# Patient Record
Sex: MALE | Race: WHITE | NOT HISPANIC OR LATINO
[De-identification: names, ages, dates, MRNs, and addresses within clinical notes are randomized per-mention and may not be internally consistent; named-entity substitution may affect disease eponyms.]

---

## 2019-01-17 ENCOUNTER — APPOINTMENT (OUTPATIENT)
Dept: INTERNAL MEDICINE | Facility: CLINIC | Age: 79
End: 2019-01-17
Payer: MEDICARE

## 2019-01-17 ENCOUNTER — RECORD ABSTRACTING (OUTPATIENT)
Age: 79
End: 2019-01-17

## 2019-01-17 ENCOUNTER — NON-APPOINTMENT (OUTPATIENT)
Age: 79
End: 2019-01-17

## 2019-01-17 VITALS — SYSTOLIC BLOOD PRESSURE: 118 MMHG | DIASTOLIC BLOOD PRESSURE: 64 MMHG

## 2019-01-17 VITALS — BODY MASS INDEX: 26.07 KG/M2 | HEIGHT: 69 IN | WEIGHT: 176 LBS

## 2019-01-17 DIAGNOSIS — R31.29 OTHER MICROSCOPIC HEMATURIA: ICD-10-CM

## 2019-01-17 DIAGNOSIS — Z87.09 PERSONAL HISTORY OF OTHER DISEASES OF THE RESPIRATORY SYSTEM: ICD-10-CM

## 2019-01-17 DIAGNOSIS — Z87.891 PERSONAL HISTORY OF NICOTINE DEPENDENCE: ICD-10-CM

## 2019-01-17 DIAGNOSIS — Z90.49 ACQUIRED ABSENCE OF OTHER SPECIFIED PARTS OF DIGESTIVE TRACT: ICD-10-CM

## 2019-01-17 DIAGNOSIS — Z82.49 FAMILY HISTORY OF ISCHEMIC HEART DISEASE AND OTHER DISEASES OF THE CIRCULATORY SYSTEM: ICD-10-CM

## 2019-01-17 LAB
BILIRUB UR QL STRIP: NEGATIVE
CLARITY UR: CLEAR
COLLECTION METHOD: NORMAL
GLUCOSE UR-MCNC: NEGATIVE
HCG UR QL: 0.2 EU/DL
HGB UR QL STRIP.AUTO: NEGATIVE
KETONES UR-MCNC: NEGATIVE
LEUKOCYTE ESTERASE UR QL STRIP: NEGATIVE
NITRITE UR QL STRIP: NEGATIVE
PH UR STRIP: 5
PROT UR STRIP-MCNC: NEGATIVE
SP GR UR STRIP: 1.01

## 2019-01-17 PROCEDURE — G0439: CPT

## 2019-01-17 PROCEDURE — 36415 COLL VENOUS BLD VENIPUNCTURE: CPT

## 2019-01-17 PROCEDURE — 93000 ELECTROCARDIOGRAM COMPLETE: CPT

## 2019-01-17 PROCEDURE — 81003 URINALYSIS AUTO W/O SCOPE: CPT | Mod: QW

## 2019-01-17 NOTE — PLAN
[FreeTextEntry1] : Check bloods\par Followup with urologist\par Same medications\par Echocardiogram to evaluate aortic valve

## 2019-01-17 NOTE — HISTORY OF PRESENT ILLNESS
[FreeTextEntry1] : Here for complete examination [de-identified] : The patient had cholecystectomy in 2017 with gangrenous gallbladder\par Past cataract surgery\par Status post coronary artery bypass grafting 2002\par Thallium stress test 2 years ago showed no ischemia but an ejection fraction of 44%\par Echocardiography done in January of 2018 showed a small inferobasal hypokinesis with an estimated ejection fraction in the 55% range and borderline enlargement of the proximal aortic root with moderate aortic insufficiency\par He has hyperlipidemia on both a statin and a fibroid with no muscle side effects\par He has a past history of prostate cancer and sees his urologist regularly\par active with no exertional symptoms

## 2019-01-17 NOTE — ASSESSMENT
[FreeTextEntry1] : Patient remains active and athletic with no symptoms with exercise\par Blood pressure is excellent\par Blood work drawn today are pending\par Past moderate aortic insufficiency on echocardiogram

## 2019-01-17 NOTE — REVIEW OF SYSTEMS
[Negative] : Heme/Lymph [Nocturia] : nocturia [Frequency] : frequency [Dysuria] : no dysuria [Incontinence] : no incontinence [Hesitancy] : no hesitancy [Hematuria] : no hematuria [Impotence] : no impotency [Poor Libido] : libido not poor

## 2019-01-17 NOTE — PHYSICAL EXAM
[No Acute Distress] : no acute distress [Well Nourished] : well nourished [Well Developed] : well developed [Well-Appearing] : well-appearing [Normal Sclera/Conjunctiva] : normal sclera/conjunctiva [PERRL] : pupils equal round and reactive to light [EOMI] : extraocular movements intact [Normal Outer Ear/Nose] : the outer ears and nose were normal in appearance [Normal Oropharynx] : the oropharynx was normal [No JVD] : no jugular venous distention [Supple] : supple [No Lymphadenopathy] : no lymphadenopathy [Thyroid Normal, No Nodules] : the thyroid was normal and there were no nodules present [No Respiratory Distress] : no respiratory distress  [Clear to Auscultation] : lungs were clear to auscultation bilaterally [No Accessory Muscle Use] : no accessory muscle use [Normal Rate] : normal rate  [Regular Rhythm] : with a regular rhythm [Normal S1, S2] : normal S1 and S2 [No Carotid Bruits] : no carotid bruits [No Abdominal Bruit] : a ~M bruit was not heard ~T in the abdomen [No Varicosities] : no varicosities [Pedal Pulses Present] : the pedal pulses are present [No Edema] : there was no peripheral edema [No Extremity Clubbing/Cyanosis] : no extremity clubbing/cyanosis [No Palpable Aorta] : no palpable aorta [Soft] : abdomen soft [Non Tender] : non-tender [Non-distended] : non-distended [No HSM] : no HSM [Normal Bowel Sounds] : normal bowel sounds [Normal Posterior Cervical Nodes] : no posterior cervical lymphadenopathy [Normal Anterior Cervical Nodes] : no anterior cervical lymphadenopathy [No CVA Tenderness] : no CVA  tenderness [No Spinal Tenderness] : no spinal tenderness [No Joint Swelling] : no joint swelling [Grossly Normal Strength/Tone] : grossly normal strength/tone [No Rash] : no rash [Normal Gait] : normal gait [Coordination Grossly Intact] : coordination grossly intact [No Focal Deficits] : no focal deficits [Deep Tendon Reflexes (DTR)] : deep tendon reflexes were 2+ and symmetric [Normal Affect] : the affect was normal [Normal Insight/Judgement] : insight and judgment were intact [Normal S1] : normal S1 [Normal S2] : normal S2 [No Gallop] : no gallop heard [I] : a grade 1 [No Masses] : no palpable masses [No Nipple Discharge] : no nipple discharge [No Axillary Lymphadenopathy] : no axillary lymphadenopathy [No Mass] : no mass [Testes Mass (___cm)] : there were no testicular masses [No Prostate Nodules] : no prostate nodules [Stool Occult Blood] : stool negative for occult blood [FreeTextEntry1] : decreased anal tone

## 2019-01-22 ENCOUNTER — RESULT REVIEW (OUTPATIENT)
Age: 79
End: 2019-01-22

## 2019-01-22 LAB
25(OH)D3 SERPL-MCNC: 24.8 NG/ML
ALBUMIN SERPL ELPH-MCNC: 4.6 G/DL
ALP BLD-CCNC: 29 U/L
ALT SERPL-CCNC: 14 U/L
ANION GAP SERPL CALC-SCNC: 13 MMOL/L
AST SERPL-CCNC: 17 U/L
BASOPHILS # BLD AUTO: 0.03 K/UL
BASOPHILS NFR BLD AUTO: 0.4 %
BILIRUB SERPL-MCNC: 0.4 MG/DL
BUN SERPL-MCNC: 23 MG/DL
CALCIUM SERPL-MCNC: 9.9 MG/DL
CHLORIDE SERPL-SCNC: 102 MMOL/L
CHOLEST SERPL-MCNC: 141 MG/DL
CHOLEST/HDLC SERPL: 3.6 RATIO
CK SERPL-CCNC: 37 U/L
CO2 SERPL-SCNC: 24 MMOL/L
CREAT SERPL-MCNC: 1.28 MG/DL
EOSINOPHIL # BLD AUTO: 0.43 K/UL
EOSINOPHIL NFR BLD AUTO: 6.3 %
FERRITIN SERPL-MCNC: 334 NG/ML
GLUCOSE SERPL-MCNC: 98 MG/DL
HBA1C MFR BLD HPLC: 5.4 %
HCT VFR BLD CALC: 37.1 %
HDLC SERPL-MCNC: 39 MG/DL
HGB BLD-MCNC: 12 G/DL
IMM GRANULOCYTES NFR BLD AUTO: 0.6 %
IRON SATN MFR SERPL: 32 %
IRON SERPL-MCNC: 126 UG/DL
LDLC SERPL CALC-MCNC: 61 MG/DL
LYMPHOCYTES # BLD AUTO: 1.47 K/UL
LYMPHOCYTES NFR BLD AUTO: 21.6 %
MAN DIFF?: NORMAL
MCHC RBC-ENTMCNC: 29.9 PG
MCHC RBC-ENTMCNC: 32.3 GM/DL
MCV RBC AUTO: 92.5 FL
MONOCYTES # BLD AUTO: 0.5 K/UL
MONOCYTES NFR BLD AUTO: 7.3 %
NEUTROPHILS # BLD AUTO: 4.34 K/UL
NEUTROPHILS NFR BLD AUTO: 63.8 %
PLATELET # BLD AUTO: 263 K/UL
POTASSIUM SERPL-SCNC: 4.7 MMOL/L
PROT SERPL-MCNC: 7.4 G/DL
PSA SERPL-MCNC: 0.8 NG/ML
RBC # BLD: 4.01 M/UL
RBC # FLD: 13.2 %
SODIUM SERPL-SCNC: 139 MMOL/L
T4 FREE SERPL-MCNC: 1.2 NG/DL
TIBC SERPL-MCNC: 392 UG/DL
TRIGL SERPL-MCNC: 204 MG/DL
TSH SERPL-ACNC: 1.28 UIU/ML
UIBC SERPL-MCNC: 266 UG/DL
VIT B12 SERPL-MCNC: 408 PG/ML
WBC # FLD AUTO: 6.81 K/UL

## 2019-01-25 ENCOUNTER — APPOINTMENT (OUTPATIENT)
Dept: INTERNAL MEDICINE | Facility: CLINIC | Age: 79
End: 2019-01-25
Payer: MEDICARE

## 2019-01-25 PROCEDURE — 93306 TTE W/DOPPLER COMPLETE: CPT

## 2019-01-29 ENCOUNTER — MEDICATION RENEWAL (OUTPATIENT)
Age: 79
End: 2019-01-29

## 2019-02-07 ENCOUNTER — MEDICATION RENEWAL (OUTPATIENT)
Age: 79
End: 2019-02-07

## 2019-05-06 ENCOUNTER — MEDICATION RENEWAL (OUTPATIENT)
Age: 79
End: 2019-05-06

## 2020-03-04 ENCOUNTER — APPOINTMENT (OUTPATIENT)
Dept: INTERNAL MEDICINE | Facility: CLINIC | Age: 80
End: 2020-03-04
Payer: MEDICARE

## 2020-03-04 ENCOUNTER — NON-APPOINTMENT (OUTPATIENT)
Age: 80
End: 2020-03-04

## 2020-03-04 VITALS — SYSTOLIC BLOOD PRESSURE: 128 MMHG | DIASTOLIC BLOOD PRESSURE: 62 MMHG

## 2020-03-04 VITALS — BODY MASS INDEX: 26.51 KG/M2 | HEIGHT: 69 IN | WEIGHT: 179 LBS

## 2020-03-04 LAB
BILIRUB UR QL STRIP: NEGATIVE
CLARITY UR: CLEAR
COLLECTION METHOD: NORMAL
GLUCOSE UR-MCNC: NEGATIVE
HCG UR QL: 0.2 EU/DL
HGB UR QL STRIP.AUTO: NEGATIVE
KETONES UR-MCNC: NEGATIVE
LEUKOCYTE ESTERASE UR QL STRIP: NEGATIVE
NITRITE UR QL STRIP: NEGATIVE
PH UR STRIP: 5
PROT UR STRIP-MCNC: NEGATIVE
SP GR UR STRIP: 1.02

## 2020-03-04 PROCEDURE — G0439: CPT | Mod: 25

## 2020-03-04 PROCEDURE — 36415 COLL VENOUS BLD VENIPUNCTURE: CPT

## 2020-03-04 PROCEDURE — 93000 ELECTROCARDIOGRAM COMPLETE: CPT | Mod: 59

## 2020-03-04 PROCEDURE — 81003 URINALYSIS AUTO W/O SCOPE: CPT | Mod: QW

## 2020-03-04 PROCEDURE — G0444 DEPRESSION SCREEN ANNUAL: CPT

## 2020-03-04 NOTE — HISTORY OF PRESENT ILLNESS
[FreeTextEntry1] :  for complete examination [de-identified] : s/p cholecystectomy in 2017 with gangrenous gallbladder\par s/p coronary artery bypass grafting x 5 in 2002\par Thallium stress test 1/2017 showed no significant ischemia but an ejection fraction of 44%\par echocardiography 1/2019 showed small inferobasal hypokinesis with an estimated ejection fraction in the 55% - 60 % range and moderate aortic insufficiency\par hyperlipidemia on both a statin and fibrate with no muscle side effects\par active, plays golf, doesn't use cart, sometimes walks up to 7 miles, no exertional issues\par does at least 50 pushups and sit ups daily\par past history of prostate cancer and sees his urologist regularly, recent rectal ok, declines rectal today\par active with no exertional symptoms\par on DAP therapy

## 2020-03-04 NOTE — HEALTH RISK ASSESSMENT
[0-5] : 0-5 [] : Yes [2 - 4 times a month (2 pts)] : 2-4 times a month (2 points) [Never (0 pts)] : Never (0 points) [1 or 2 (0 pts)] : 1 or 2 (0 points) [0] : 2) Feeling down, depressed, or hopeless: Not at all (0) [Patient reported colonoscopy was normal] : Patient reported colonoscopy was normal [de-identified] : occasional cigars [Audit-CScore] : 2 [YearQuit] : 1962 [[id number]] : 0 [ColonoscopyDate] : 1/2014

## 2020-03-04 NOTE — ASSESSMENT
[FreeTextEntry1] : Doing very well 20 years status post quintuple bypass\par Active and athletic in [employer]\par Mentally sharp\par Uses sunblock good skin care\par Closely followed by urologist\par Aortic insufficiency moderate on last echo\par Blood pressure well controlled

## 2020-03-04 NOTE — REVIEW OF SYSTEMS
[Nocturia] : nocturia [Frequency] : frequency [Negative] : Heme/Lymph [Hearing Loss] : hearing loss [Earache] : no earache [Nosebleeds] : no nosebleeds [Postnasal Drip] : no postnasal drip [Nasal Discharge] : no nasal discharge [Sore Throat] : no sore throat [Hoarseness] : no hoarseness [Chest Pain] : no chest pain [Palpitations] : no palpitations [Claudication] : no  leg claudication [Lower Ext Edema] : no lower extremity edema [Orthopena] : no orthopnea [Paroxysmal Nocturnal Dyspnea] : no paroxysmal nocturnal dyspnea [Incontinence] : no incontinence [Dysuria] : no dysuria [Hesitancy] : no hesitancy [Hematuria] : no hematuria [Poor Libido] : libido not poor [Impotence] : no impotency

## 2020-03-05 ENCOUNTER — APPOINTMENT (OUTPATIENT)
Dept: INTERNAL MEDICINE | Facility: CLINIC | Age: 80
End: 2020-03-05
Payer: MEDICARE

## 2020-03-05 PROCEDURE — 93306 TTE W/DOPPLER COMPLETE: CPT

## 2020-03-06 ENCOUNTER — TRANSCRIPTION ENCOUNTER (OUTPATIENT)
Age: 80
End: 2020-03-06

## 2020-03-06 LAB
25(OH)D3 SERPL-MCNC: 42.6 NG/ML
ALBUMIN MFR SERPL ELPH: 61.4 %
ALBUMIN SERPL ELPH-MCNC: 4.4 G/DL
ALBUMIN SERPL-MCNC: 4.4 G/DL
ALBUMIN/GLOB SERPL: 1.6 RATIO
ALP BLD-CCNC: 31 U/L
ALPHA1 GLOB MFR SERPL ELPH: 3.7 %
ALPHA1 GLOB SERPL ELPH-MCNC: 0.3 G/DL
ALPHA2 GLOB MFR SERPL ELPH: 7.9 %
ALPHA2 GLOB SERPL ELPH-MCNC: 0.6 G/DL
ALT SERPL-CCNC: 14 U/L
ANION GAP SERPL CALC-SCNC: 15 MMOL/L
APPEARANCE: CLEAR
AST SERPL-CCNC: 18 U/L
B-GLOBULIN MFR SERPL ELPH: 12.2 %
B-GLOBULIN SERPL ELPH-MCNC: 0.9 G/DL
BACTERIA: NEGATIVE
BASOPHILS # BLD AUTO: 0.04 K/UL
BASOPHILS NFR BLD AUTO: 0.6 %
BILIRUB SERPL-MCNC: 0.7 MG/DL
BILIRUBIN URINE: NEGATIVE
BLOOD URINE: NEGATIVE
BUN SERPL-MCNC: 21 MG/DL
CALCIUM SERPL-MCNC: 9.9 MG/DL
CHLORIDE SERPL-SCNC: 106 MMOL/L
CHOLEST SERPL-MCNC: 133 MG/DL
CHOLEST/HDLC SERPL: 3.2 RATIO
CK SERPL-CCNC: 68 U/L
CO2 SERPL-SCNC: 23 MMOL/L
COLOR: NORMAL
CREAT SERPL-MCNC: 1.21 MG/DL
DEPRECATED KAPPA LC FREE/LAMBDA SER: 2.15 RATIO
EOSINOPHIL # BLD AUTO: 0.34 K/UL
EOSINOPHIL NFR BLD AUTO: 5.3 %
ESTIMATED AVERAGE GLUCOSE: 105 MG/DL
FERRITIN SERPL-MCNC: 251 NG/ML
GAMMA GLOB FLD ELPH-MCNC: 1.1 G/DL
GAMMA GLOB MFR SERPL ELPH: 14.8 %
GLUCOSE QUALITATIVE U: NEGATIVE
GLUCOSE SERPL-MCNC: 100 MG/DL
HBA1C MFR BLD HPLC: 5.3 %
HCT VFR BLD CALC: 38.1 %
HDLC SERPL-MCNC: 42 MG/DL
HGB BLD-MCNC: 12.3 G/DL
HYALINE CASTS: 2 /LPF
IGA SER QL IEP: 193 MG/DL
IGG SER QL IEP: 966 MG/DL
IGM SER QL IEP: 35 MG/DL
IMM GRANULOCYTES NFR BLD AUTO: 1.1 %
INTERPRETATION SERPL IEP-IMP: NORMAL
IRON SATN MFR SERPL: 32 %
IRON SERPL-MCNC: 124 UG/DL
KAPPA LC CSF-MCNC: 1.39 MG/DL
KAPPA LC SERPL-MCNC: 2.99 MG/DL
KETONES URINE: NEGATIVE
LDLC SERPL CALC-MCNC: 57 MG/DL
LEUKOCYTE ESTERASE URINE: NEGATIVE
LYMPHOCYTES # BLD AUTO: 1.53 K/UL
LYMPHOCYTES NFR BLD AUTO: 23.8 %
M PROTEIN SPEC IFE-MCNC: NORMAL
MAN DIFF?: NORMAL
MCHC RBC-ENTMCNC: 30.6 PG
MCHC RBC-ENTMCNC: 32.3 GM/DL
MCV RBC AUTO: 94.8 FL
MICROSCOPIC-UA: NORMAL
MONOCYTES # BLD AUTO: 0.47 K/UL
MONOCYTES NFR BLD AUTO: 7.3 %
NEUTROPHILS # BLD AUTO: 3.97 K/UL
NEUTROPHILS NFR BLD AUTO: 61.9 %
NITRITE URINE: NEGATIVE
PH URINE: 5.5
PLATELET # BLD AUTO: 220 K/UL
POTASSIUM SERPL-SCNC: 4.8 MMOL/L
PROT SERPL-MCNC: 7.1 G/DL
PROTEIN URINE: NEGATIVE
PSA SERPL-MCNC: 0.91 NG/ML
RBC # BLD: 4.02 M/UL
RBC # FLD: 13.2 %
RED BLOOD CELLS URINE: 2 /HPF
SODIUM SERPL-SCNC: 144 MMOL/L
SPECIFIC GRAVITY URINE: 1.02
SQUAMOUS EPITHELIAL CELLS: 1 /HPF
T4 FREE SERPL-MCNC: 1.2 NG/DL
TIBC SERPL-MCNC: 383 UG/DL
TRIGL SERPL-MCNC: 171 MG/DL
TSH SERPL-ACNC: 1.34 UIU/ML
UIBC SERPL-MCNC: 259 UG/DL
UROBILINOGEN URINE: NORMAL
VIT B12 SERPL-MCNC: 548 PG/ML
WBC # FLD AUTO: 6.42 K/UL
WHITE BLOOD CELLS URINE: 1 /HPF

## 2021-02-09 ENCOUNTER — RX RENEWAL (OUTPATIENT)
Age: 81
End: 2021-02-09

## 2021-03-24 ENCOUNTER — RX RENEWAL (OUTPATIENT)
Age: 81
End: 2021-03-24

## 2021-04-18 ENCOUNTER — RX RENEWAL (OUTPATIENT)
Age: 81
End: 2021-04-18

## 2021-04-28 ENCOUNTER — APPOINTMENT (OUTPATIENT)
Dept: INTERNAL MEDICINE | Facility: CLINIC | Age: 81
End: 2021-04-28
Payer: MEDICARE

## 2021-04-28 ENCOUNTER — NON-APPOINTMENT (OUTPATIENT)
Age: 81
End: 2021-04-28

## 2021-04-28 VITALS
TEMPERATURE: 97.6 F | DIASTOLIC BLOOD PRESSURE: 85 MMHG | BODY MASS INDEX: 25.99 KG/M2 | HEART RATE: 64 BPM | SYSTOLIC BLOOD PRESSURE: 178 MMHG | WEIGHT: 176 LBS | OXYGEN SATURATION: 99 %

## 2021-04-28 VITALS — SYSTOLIC BLOOD PRESSURE: 128 MMHG | DIASTOLIC BLOOD PRESSURE: 68 MMHG

## 2021-04-28 DIAGNOSIS — Z23 ENCOUNTER FOR IMMUNIZATION: ICD-10-CM

## 2021-04-28 LAB
BILIRUB UR QL STRIP: NEGATIVE
GLUCOSE UR-MCNC: NEGATIVE
HCG UR QL: 0.2 EU/DL
HGB UR QL STRIP.AUTO: NEGATIVE
KETONES UR-MCNC: NEGATIVE
LEUKOCYTE ESTERASE UR QL STRIP: NEGATIVE
NITRITE UR QL STRIP: NEGATIVE
PH UR STRIP: 5
PROT UR STRIP-MCNC: NEGATIVE
SP GR UR STRIP: 1.02

## 2021-04-28 PROCEDURE — 99072 ADDL SUPL MATRL&STAF TM PHE: CPT

## 2021-04-28 PROCEDURE — G0439: CPT

## 2021-04-28 PROCEDURE — 82270 OCCULT BLOOD FECES: CPT

## 2021-04-28 PROCEDURE — 93000 ELECTROCARDIOGRAM COMPLETE: CPT | Mod: 59

## 2021-04-28 PROCEDURE — 81003 URINALYSIS AUTO W/O SCOPE: CPT | Mod: QW

## 2021-04-28 PROCEDURE — G0444 DEPRESSION SCREEN ANNUAL: CPT

## 2021-04-28 PROCEDURE — 36415 COLL VENOUS BLD VENIPUNCTURE: CPT

## 2021-04-28 RX ORDER — ASPIRIN ENTERIC COATED TABLETS 81 MG 81 MG/1
81 TABLET, DELAYED RELEASE ORAL DAILY
Refills: 0 | Status: ACTIVE | COMMUNITY
Start: 2021-04-28

## 2021-04-28 RX ORDER — MELATONIN 3 MG
25 MCG TABLET ORAL
Qty: 90 | Refills: 3 | Status: ACTIVE | COMMUNITY
Start: 2021-04-28 | End: 1900-01-01

## 2021-04-28 NOTE — HISTORY OF PRESENT ILLNESS
[FreeTextEntry1] :  for complete examination [de-identified] : s/p cholecystectomy in 2017 with gangrenous gallbladder\par s/p coronary artery bypass grafting x 5 in 2002\par Thallium stress test 1/2017 showed no significant ischemia but an ejection fraction of 44%\par echocardiography 1/2019 showed small inferobasal hypokinesis with an estimated ejection fraction in the 55% - 60 % range and moderate aortic insufficiency\par echo 5/2020 EF 50-55%, inferoseptal and basal inferior hypokinesis, 1 to 2 + AI, mild AS, borderline elevated RSP\par hyperlipidemia on both a statin and fibrate with no muscle side effects\par active, plays golf, doesn't use cart, cant walk several miles this way, sometimes walks up to 2 miles a day, no exertional issues\par still does at least 50 pushups and sit ups daily\par past history of prostate cancer and sees his urologist regularly, recent rectal ok, declines rectal today\par active with no exertional symptoms\par on DAP therapy\par mild anemia

## 2021-04-28 NOTE — PHYSICAL EXAM
[Well Nourished] : well nourished [Well Developed] : well developed [Well-Appearing] : well-appearing [Normal Sclera/Conjunctiva] : normal sclera/conjunctiva [PERRL] : pupils equal round and reactive to light [EOMI] : extraocular movements intact [Normal Outer Ear/Nose] : the outer ears and nose were normal in appearance [Normal Oropharynx] : the oropharynx was normal [No JVD] : no jugular venous distention [No Lymphadenopathy] : no lymphadenopathy [Supple] : supple [Thyroid Normal, No Nodules] : the thyroid was normal and there were no nodules present [No Respiratory Distress] : no respiratory distress  [No Accessory Muscle Use] : no accessory muscle use [Clear to Auscultation] : lungs were clear to auscultation bilaterally [Normal Rate] : normal rate  [Regular Rhythm] : with a regular rhythm [Normal S1, S2] : normal S1 and S2 [Distant] : the heart sounds were distant [I] : a grade 1 [No Carotid Bruits] : no carotid bruits [No Abdominal Bruit] : a ~M bruit was not heard ~T in the abdomen [No Varicosities] : no varicosities [Pedal Pulses Present] : the pedal pulses are present [No Palpable Aorta] : no palpable aorta [No Extremity Clubbing/Cyanosis] : no extremity clubbing/cyanosis [___ +] : bilateral [unfilled]U+ pretibial pitting edema [Soft] : abdomen soft [Non Tender] : non-tender [Non-distended] : non-distended [No HSM] : no HSM [Normal Bowel Sounds] : normal bowel sounds [Normal Posterior Cervical Nodes] : no posterior cervical lymphadenopathy [Normal Anterior Cervical Nodes] : no anterior cervical lymphadenopathy [No CVA Tenderness] : no CVA  tenderness [No Spinal Tenderness] : no spinal tenderness [No Joint Swelling] : no joint swelling [Grossly Normal Strength/Tone] : grossly normal strength/tone [No Rash] : no rash [Coordination Grossly Intact] : coordination grossly intact [No Focal Deficits] : no focal deficits [Normal Gait] : normal gait [Deep Tendon Reflexes (DTR)] : deep tendon reflexes were 2+ and symmetric [Normal Affect] : the affect was normal [Normal Insight/Judgement] : insight and judgment were intact [Normal Appearance] : normal in appearance [No Masses] : no palpable masses [No Mass] : no mass [Stool Occult Blood] : stool negative for occult blood [No Prostate Nodules] : no prostate nodules

## 2021-04-28 NOTE — HEALTH RISK ASSESSMENT
[] : Yes [0-5] : 0-5 [1 or 2 (0 pts)] : 1 or 2 (0 points) [Never (0 pts)] : Never (0 points) [0] : 2) Feeling down, depressed, or hopeless: Not at all (0) [Patient reported colonoscopy was normal] : Patient reported colonoscopy was normal [Monthly or less (1 pt)] : Monthly or less (1 point) [No falls in past year] : Patient reported no falls in the past year [de-identified] : occasional cigars [YearQuit] : 1962 [Audit-CScore] : 2 [[id number]] : 0 [ColonoscopyDate] : 1/2014

## 2021-04-28 NOTE — PLAN
[FreeTextEntry1] : check bloods\par heme consult if anemia worse\par echo\par derm and urology follow up\par after above consider d/c of asa or clopidogrel

## 2021-04-28 NOTE — REVIEW OF SYSTEMS
[Hearing Loss] : hearing loss [Nocturia] : nocturia [Frequency] : frequency [Negative] : Heme/Lymph [Earache] : no earache [Nosebleeds] : no nosebleeds [Postnasal Drip] : no postnasal drip [Nasal Discharge] : no nasal discharge [Sore Throat] : no sore throat [Hoarseness] : no hoarseness [Chest Pain] : no chest pain [Palpitations] : no palpitations [Claudication] : no  leg claudication [Lower Ext Edema] : no lower extremity edema [Orthopena] : no orthopnea [Paroxysmal Nocturnal Dyspnea] : no paroxysmal nocturnal dyspnea [Dysuria] : no dysuria [Incontinence] : no incontinence [Hesitancy] : no hesitancy [Hematuria] : no hematuria [Impotence] : no impotency [Poor Libido] : libido not poor [FreeTextEntry8] : stable, nocturia 1 to 2

## 2021-04-28 NOTE — ASSESSMENT
[FreeTextEntry1] : Doing well, active, 21 years status post quintuple bypass\par Mentally sharp\par Uses sunblock good skin care\par needs to see dermatologist\par Closely followed by urologist\par Aortic insufficiency moderate on last echo\par Blood pressure well controlled\par on DAP, probably not necessary, but no bleeding issues

## 2021-04-29 ENCOUNTER — NON-APPOINTMENT (OUTPATIENT)
Age: 81
End: 2021-04-29

## 2021-04-29 LAB
25(OH)D3 SERPL-MCNC: 52.9 NG/ML
ALBUMIN SERPL ELPH-MCNC: 4.8 G/DL
ALP BLD-CCNC: 39 U/L
ALT SERPL-CCNC: 19 U/L
ANION GAP SERPL CALC-SCNC: 13 MMOL/L
APPEARANCE: CLEAR
AST SERPL-CCNC: 22 U/L
BACTERIA: NEGATIVE
BASOPHILS # BLD AUTO: 0.05 K/UL
BASOPHILS NFR BLD AUTO: 0.6 %
BILIRUB SERPL-MCNC: 0.5 MG/DL
BILIRUBIN URINE: NEGATIVE
BLOOD URINE: NEGATIVE
BUN SERPL-MCNC: 25 MG/DL
CALCIUM SERPL-MCNC: 10 MG/DL
CHLORIDE SERPL-SCNC: 106 MMOL/L
CHOLEST SERPL-MCNC: 176 MG/DL
CK SERPL-CCNC: 60 U/L
CO2 SERPL-SCNC: 24 MMOL/L
COLOR: NORMAL
CREAT SERPL-MCNC: 1.38 MG/DL
EOSINOPHIL # BLD AUTO: 0.52 K/UL
EOSINOPHIL NFR BLD AUTO: 6.4 %
ESTIMATED AVERAGE GLUCOSE: 108 MG/DL
FERRITIN SERPL-MCNC: 331 NG/ML
GLUCOSE QUALITATIVE U: NEGATIVE
GLUCOSE SERPL-MCNC: 104 MG/DL
HBA1C MFR BLD HPLC: 5.4 %
HCT VFR BLD CALC: 39.3 %
HDLC SERPL-MCNC: 47 MG/DL
HGB BLD-MCNC: 12.4 G/DL
HYALINE CASTS: 1 /LPF
IMM GRANULOCYTES NFR BLD AUTO: 1.1 %
IRON SATN MFR SERPL: 32 %
IRON SERPL-MCNC: 116 UG/DL
KETONES URINE: NEGATIVE
LDLC SERPL CALC-MCNC: 93 MG/DL
LEUKOCYTE ESTERASE URINE: NEGATIVE
LYMPHOCYTES # BLD AUTO: 1.76 K/UL
LYMPHOCYTES NFR BLD AUTO: 21.6 %
MAN DIFF?: NORMAL
MCHC RBC-ENTMCNC: 29.6 PG
MCHC RBC-ENTMCNC: 31.6 GM/DL
MCV RBC AUTO: 93.8 FL
MICROSCOPIC-UA: NORMAL
MONOCYTES # BLD AUTO: 0.64 K/UL
MONOCYTES NFR BLD AUTO: 7.8 %
NEUTROPHILS # BLD AUTO: 5.1 K/UL
NEUTROPHILS NFR BLD AUTO: 62.5 %
NITRITE URINE: NEGATIVE
NONHDLC SERPL-MCNC: 130 MG/DL
PH URINE: 5
PLATELET # BLD AUTO: 271 K/UL
POTASSIUM SERPL-SCNC: 4.6 MMOL/L
PROT SERPL-MCNC: 7.4 G/DL
PROTEIN URINE: NEGATIVE
PSA SERPL-MCNC: 1.12 NG/ML
RBC # BLD: 4.19 M/UL
RBC # FLD: 13.2 %
RED BLOOD CELLS URINE: 0 /HPF
SODIUM SERPL-SCNC: 143 MMOL/L
SPECIFIC GRAVITY URINE: 1.01
SQUAMOUS EPITHELIAL CELLS: 0 /HPF
T4 FREE SERPL-MCNC: 1.2 NG/DL
TIBC SERPL-MCNC: 366 UG/DL
TRIGL SERPL-MCNC: 185 MG/DL
TSH SERPL-ACNC: 1.64 UIU/ML
UIBC SERPL-MCNC: 251 UG/DL
UROBILINOGEN URINE: NORMAL
VIT B12 SERPL-MCNC: 557 PG/ML
WBC # FLD AUTO: 8.16 K/UL
WHITE BLOOD CELLS URINE: 1 /HPF

## 2021-04-30 LAB
ALBUMIN MFR SERPL ELPH: 59.8 %
ALBUMIN SERPL-MCNC: 4.4 G/DL
ALBUMIN/GLOB SERPL: 1.5 RATIO
ALPHA1 GLOB MFR SERPL ELPH: 3.9 %
ALPHA1 GLOB SERPL ELPH-MCNC: 0.3 G/DL
ALPHA2 GLOB MFR SERPL ELPH: 8.6 %
ALPHA2 GLOB SERPL ELPH-MCNC: 0.6 G/DL
B-GLOBULIN MFR SERPL ELPH: 13.1 %
B-GLOBULIN SERPL ELPH-MCNC: 1 G/DL
DEPRECATED KAPPA LC FREE/LAMBDA SER: 1.89 RATIO
GAMMA GLOB FLD ELPH-MCNC: 1.1 G/DL
GAMMA GLOB MFR SERPL ELPH: 14.6 %
IGA SER QL IEP: 211 MG/DL
IGG SER QL IEP: 1135 MG/DL
IGM SER QL IEP: 39 MG/DL
INTERPRETATION SERPL IEP-IMP: NORMAL
KAPPA LC CSF-MCNC: 1.88 MG/DL
KAPPA LC SERPL-MCNC: 3.55 MG/DL
M PROTEIN SPEC IFE-MCNC: NORMAL
PROT SERPL-MCNC: 7.4 G/DL
PROT SERPL-MCNC: 7.4 G/DL

## 2021-05-10 LAB
ANION GAP SERPL CALC-SCNC: 14 MMOL/L
BUN SERPL-MCNC: 30 MG/DL
CALCIUM SERPL-MCNC: 9.8 MG/DL
CHLORIDE SERPL-SCNC: 107 MMOL/L
CO2 SERPL-SCNC: 23 MMOL/L
CREAT SERPL-MCNC: 1.31 MG/DL
GLUCOSE SERPL-MCNC: 137 MG/DL
POTASSIUM SERPL-SCNC: 4.8 MMOL/L
SODIUM SERPL-SCNC: 144 MMOL/L

## 2021-06-01 ENCOUNTER — APPOINTMENT (OUTPATIENT)
Dept: INTERNAL MEDICINE | Facility: CLINIC | Age: 81
End: 2021-06-01
Payer: MEDICARE

## 2021-06-01 PROCEDURE — 99072 ADDL SUPL MATRL&STAF TM PHE: CPT

## 2021-06-01 PROCEDURE — 93306 TTE W/DOPPLER COMPLETE: CPT

## 2022-04-22 ENCOUNTER — RX RENEWAL (OUTPATIENT)
Age: 82
End: 2022-04-22

## 2022-04-25 ENCOUNTER — RX RENEWAL (OUTPATIENT)
Age: 82
End: 2022-04-25

## 2022-06-24 ENCOUNTER — RX RENEWAL (OUTPATIENT)
Age: 82
End: 2022-06-24

## 2022-07-14 ENCOUNTER — NON-APPOINTMENT (OUTPATIENT)
Age: 82
End: 2022-07-14

## 2022-07-14 ENCOUNTER — APPOINTMENT (OUTPATIENT)
Dept: INTERNAL MEDICINE | Facility: CLINIC | Age: 82
End: 2022-07-14

## 2022-07-14 VITALS
DIASTOLIC BLOOD PRESSURE: 76 MMHG | OXYGEN SATURATION: 98 % | WEIGHT: 182 LBS | TEMPERATURE: 97.8 F | BODY MASS INDEX: 26.96 KG/M2 | HEART RATE: 69 BPM | HEIGHT: 69 IN | SYSTOLIC BLOOD PRESSURE: 143 MMHG

## 2022-07-14 DIAGNOSIS — Z00.00 ENCOUNTER FOR GENERAL ADULT MEDICAL EXAMINATION W/OUT ABNORMAL FINDINGS: ICD-10-CM

## 2022-07-14 LAB
BILIRUB UR QL STRIP: NORMAL
CLARITY UR: CLEAR
COLLECTION METHOD: NORMAL
GLUCOSE UR-MCNC: NORMAL
HCG UR QL: 0.2 EU/DL
HGB UR QL STRIP.AUTO: NORMAL
KETONES UR-MCNC: NORMAL
LEUKOCYTE ESTERASE UR QL STRIP: NORMAL
NITRITE UR QL STRIP: NORMAL
PH UR STRIP: 6
PROT UR STRIP-MCNC: NORMAL
SP GR UR STRIP: 1.02

## 2022-07-14 PROCEDURE — G0444 DEPRESSION SCREEN ANNUAL: CPT

## 2022-07-14 PROCEDURE — 93000 ELECTROCARDIOGRAM COMPLETE: CPT | Mod: 59

## 2022-07-14 PROCEDURE — 81003 URINALYSIS AUTO W/O SCOPE: CPT | Mod: QW

## 2022-07-14 PROCEDURE — G0439: CPT

## 2022-07-14 PROCEDURE — 36415 COLL VENOUS BLD VENIPUNCTURE: CPT

## 2022-07-14 NOTE — PHYSICAL EXAM
[Well Nourished] : well nourished [Well Developed] : well developed [Well-Appearing] : well-appearing [Normal Sclera/Conjunctiva] : normal sclera/conjunctiva [PERRL] : pupils equal round and reactive to light [EOMI] : extraocular movements intact [Normal Outer Ear/Nose] : the outer ears and nose were normal in appearance [Normal Oropharynx] : the oropharynx was normal [No JVD] : no jugular venous distention [No Lymphadenopathy] : no lymphadenopathy [Supple] : supple [Thyroid Normal, No Nodules] : the thyroid was normal and there were no nodules present [No Respiratory Distress] : no respiratory distress  [No Accessory Muscle Use] : no accessory muscle use [Clear to Auscultation] : lungs were clear to auscultation bilaterally [Normal Rate] : normal rate  [Regular Rhythm] : with a regular rhythm [Normal S1, S2] : normal S1 and S2 [Distant] : the heart sounds were distant [I] : a grade 1 [No Carotid Bruits] : no carotid bruits [No Abdominal Bruit] : a ~M bruit was not heard ~T in the abdomen [No Varicosities] : no varicosities [Pedal Pulses Present] : the pedal pulses are present [No Palpable Aorta] : no palpable aorta [No Extremity Clubbing/Cyanosis] : no extremity clubbing/cyanosis [___ +] : bilateral [unfilled]U+ pretibial pitting edema [Normal Appearance] : normal in appearance [Soft] : abdomen soft [Non Tender] : non-tender [Non-distended] : non-distended [No Masses] : no abdominal mass palpated [No HSM] : no HSM [Normal Bowel Sounds] : normal bowel sounds [No Mass] : no mass [Stool Occult Blood] : stool negative for occult blood [No Prostate Nodules] : no prostate nodules [Normal Posterior Cervical Nodes] : no posterior cervical lymphadenopathy [Normal Anterior Cervical Nodes] : no anterior cervical lymphadenopathy [No CVA Tenderness] : no CVA  tenderness [No Spinal Tenderness] : no spinal tenderness [No Joint Swelling] : no joint swelling [Grossly Normal Strength/Tone] : grossly normal strength/tone [No Rash] : no rash [Coordination Grossly Intact] : coordination grossly intact [No Focal Deficits] : no focal deficits [Normal Gait] : normal gait [Normal Affect] : the affect was normal [Normal Insight/Judgement] : insight and judgment were intact

## 2022-07-14 NOTE — ASSESSMENT
[FreeTextEntry1] : Doing well, active, 89 years status post quintuple bypass\par concerned over cognitive decline\par urged to see dermatologist\par Aortic insufficiency moderate on last echo\par Blood pressure well controlled\par on DAP, probably not necessary, but no bleeding issues,pt wishes to continue

## 2022-07-14 NOTE — HISTORY OF PRESENT ILLNESS
[FreeTextEntry1] : for complete examination [de-identified] : s/p cholecystectomy in 2017 with gangrenous gallbladder\par s/p coronary artery bypass grafting x 5 in 2002\par Thallium stress test 1/2017 showed no significant ischemia but an ejection fraction of 44%\par echocardiography 1/2019 showed small inferobasal hypokinesis with an estimated ejection fraction in the 55% - 60 % range and moderate aortic insufficiency\par echo 6/2021 EF 50-55%, inferoseptal and basal inferior hypokinesis, 2 + AI, mild AS\par hyperlipidemia on both a statin and fibrate with no muscle side effects\par active, plays golf, doesn't use cart, cant walk several miles this way, sometimes walks up to 2 miles a day, no exertional issues\par still does at least 50 pushups and sit ups daily\par past history of prostate cancer and sees his urologist regularly, recent rectal ok, declines rectal today\par active with no exertional symptoms\par on DAP therapy\par minimal elevation creatinine 1.31 last year, renal sono multiple cysts no hydro [id number]

## 2022-07-14 NOTE — REVIEW OF SYSTEMS
[Earache] : no earache [Hearing Loss] : hearing loss [Nosebleeds] : no nosebleeds [Postnasal Drip] : no postnasal drip [Nasal Discharge] : no nasal discharge [Sore Throat] : no sore throat [Hoarseness] : no hoarseness [Chest Pain] : no chest pain [Palpitations] : no palpitations [Claudication] : no  leg claudication [Lower Ext Edema] : no lower extremity edema [Orthopena] : no orthopnea [Paroxysmal Nocturnal Dyspnea] : no paroxysmal nocturnal dyspnea [Dysuria] : no dysuria [Incontinence] : no incontinence [Hesitancy] : no hesitancy [Nocturia] : nocturia [Hematuria] : no hematuria [Frequency] : frequency [Impotence] : no impotency [Poor Libido] : libido not poor [Headache] : no headache [Dizziness] : no dizziness [Fainting] : no fainting [Unsteady Walk] : no ataxia [Memory Loss] : memory loss [Negative] : Heme/Lymph [FreeTextEntry8] : stable, nocturia 1 to 2

## 2022-07-14 NOTE — HEALTH RISK ASSESSMENT
[Monthly or less (1 pt)] : Monthly or less (1 point) [1 or 2 (0 pts)] : 1 or 2 (0 points) [Never (0 pts)] : Never (0 points) [No falls in past year] : Patient reported no falls in the past year [0] : 2) Feeling down, depressed, or hopeless: Not at all (0) [Patient reported colonoscopy was normal] : Patient reported colonoscopy was normal [Former] : Former [Yes] : Yes [PHQ-2 Negative - No further assessment needed] : PHQ-2 Negative - No further assessment needed [de-identified] : occasional cigars [YearQuit] : 1962 [Audit-CScore] : 2 [[id number]] : 0 [ColonoscopyDate] : 1/2014

## 2022-07-15 ENCOUNTER — NON-APPOINTMENT (OUTPATIENT)
Age: 82
End: 2022-07-15

## 2022-07-15 LAB
25(OH)D3 SERPL-MCNC: 29.4 NG/ML
ALBUMIN SERPL ELPH-MCNC: 4.3 G/DL
ALP BLD-CCNC: 38 U/L
ALT SERPL-CCNC: 15 U/L
ANION GAP SERPL CALC-SCNC: 13 MMOL/L
APPEARANCE: CLEAR
AST SERPL-CCNC: 23 U/L
BACTERIA: NEGATIVE
BASOPHILS # BLD AUTO: 0.07 K/UL
BASOPHILS NFR BLD AUTO: 0.9 %
BILIRUB SERPL-MCNC: 0.3 MG/DL
BILIRUBIN URINE: NEGATIVE
BLOOD URINE: NEGATIVE
BUN SERPL-MCNC: 22 MG/DL
CALCIUM SERPL-MCNC: 9.1 MG/DL
CHLORIDE SERPL-SCNC: 108 MMOL/L
CHOLEST SERPL-MCNC: 132 MG/DL
CK SERPL-CCNC: 94 U/L
CO2 SERPL-SCNC: 21 MMOL/L
COLOR: NORMAL
CREAT SERPL-MCNC: 1.28 MG/DL
EGFR: 57 ML/MIN/1.73M2
EOSINOPHIL # BLD AUTO: 0.34 K/UL
EOSINOPHIL NFR BLD AUTO: 4.2 %
ESTIMATED AVERAGE GLUCOSE: 111 MG/DL
GLUCOSE QUALITATIVE U: NEGATIVE
GLUCOSE SERPL-MCNC: 92 MG/DL
HBA1C MFR BLD HPLC: 5.5 %
HCT VFR BLD CALC: 34 %
HDLC SERPL-MCNC: 38 MG/DL
HGB BLD-MCNC: 11 G/DL
HYALINE CASTS: 0 /LPF
IMM GRANULOCYTES NFR BLD AUTO: 0.7 %
KETONES URINE: NEGATIVE
LDLC SERPL CALC-MCNC: 60 MG/DL
LEUKOCYTE ESTERASE URINE: NEGATIVE
LYMPHOCYTES # BLD AUTO: 1.59 K/UL
LYMPHOCYTES NFR BLD AUTO: 19.6 %
MAN DIFF?: NORMAL
MCHC RBC-ENTMCNC: 30.4 PG
MCHC RBC-ENTMCNC: 32.4 GM/DL
MCV RBC AUTO: 93.9 FL
MICROSCOPIC-UA: NORMAL
MONOCYTES # BLD AUTO: 0.67 K/UL
MONOCYTES NFR BLD AUTO: 8.3 %
NEUTROPHILS # BLD AUTO: 5.38 K/UL
NEUTROPHILS NFR BLD AUTO: 66.3 %
NITRITE URINE: NEGATIVE
NONHDLC SERPL-MCNC: 94 MG/DL
PH URINE: 5.5
PLATELET # BLD AUTO: 235 K/UL
POTASSIUM SERPL-SCNC: 4.4 MMOL/L
PROT SERPL-MCNC: 6.7 G/DL
PROTEIN URINE: NEGATIVE
PSA SERPL-MCNC: 1.2 NG/ML
RBC # BLD: 3.62 M/UL
RBC # FLD: 13.4 %
RED BLOOD CELLS URINE: 1 /HPF
SODIUM SERPL-SCNC: 142 MMOL/L
SPECIFIC GRAVITY URINE: 1.01
SQUAMOUS EPITHELIAL CELLS: 0 /HPF
T4 FREE SERPL-MCNC: 1.2 NG/DL
TRIGL SERPL-MCNC: 171 MG/DL
TSH SERPL-ACNC: 1.25 UIU/ML
UROBILINOGEN URINE: NORMAL
VIT B12 SERPL-MCNC: 390 PG/ML
WBC # FLD AUTO: 8.11 K/UL
WHITE BLOOD CELLS URINE: 0 /HPF

## 2022-07-30 ENCOUNTER — RX RENEWAL (OUTPATIENT)
Age: 82
End: 2022-07-30

## 2022-08-04 ENCOUNTER — APPOINTMENT (OUTPATIENT)
Dept: INTERNAL MEDICINE | Facility: CLINIC | Age: 82
End: 2022-08-04

## 2022-08-04 PROCEDURE — 93306 TTE W/DOPPLER COMPLETE: CPT

## 2023-02-06 ENCOUNTER — APPOINTMENT (OUTPATIENT)
Dept: INTERNAL MEDICINE | Facility: CLINIC | Age: 83
End: 2023-02-06
Payer: MEDICARE

## 2023-02-06 VITALS
OXYGEN SATURATION: 98 % | HEIGHT: 69 IN | WEIGHT: 178 LBS | TEMPERATURE: 97.8 F | BODY MASS INDEX: 26.36 KG/M2 | HEART RATE: 63 BPM | SYSTOLIC BLOOD PRESSURE: 147 MMHG | DIASTOLIC BLOOD PRESSURE: 77 MMHG

## 2023-02-06 PROCEDURE — 99214 OFFICE O/P EST MOD 30 MIN: CPT

## 2023-02-06 NOTE — HISTORY OF PRESENT ILLNESS
[FreeTextEntry8] : Patient is 82 year male  with PMH of  CAD- s/p CABAG, Anemia, BPH, Hyperlipidemia came today with c/o right  side lower back with radiation to the right buttock hip pain for about 6 month, lately is getting worse, mostly patient feels pain when he gets up from the bed in  the morning

## 2023-03-18 ENCOUNTER — RX RENEWAL (OUTPATIENT)
Age: 83
End: 2023-03-18

## 2023-05-19 RX ORDER — FENOFIBRATE 134 MG/1
134 CAPSULE ORAL
Qty: 90 | Refills: 3 | Status: ACTIVE | COMMUNITY
Start: 2021-04-18 | End: 1900-01-01

## 2023-09-12 ENCOUNTER — APPOINTMENT (OUTPATIENT)
Dept: INTERNAL MEDICINE | Facility: CLINIC | Age: 83
End: 2023-09-12
Payer: MEDICARE

## 2023-09-12 VITALS
HEIGHT: 69 IN | SYSTOLIC BLOOD PRESSURE: 144 MMHG | OXYGEN SATURATION: 98 % | WEIGHT: 178 LBS | HEART RATE: 77 BPM | BODY MASS INDEX: 26.36 KG/M2 | DIASTOLIC BLOOD PRESSURE: 65 MMHG

## 2023-09-12 PROCEDURE — 99213 OFFICE O/P EST LOW 20 MIN: CPT

## 2023-09-18 ENCOUNTER — APPOINTMENT (OUTPATIENT)
Dept: INTERNAL MEDICINE | Facility: CLINIC | Age: 83
End: 2023-09-18
Payer: MEDICARE

## 2023-09-18 VITALS
SYSTOLIC BLOOD PRESSURE: 165 MMHG | BODY MASS INDEX: 25.76 KG/M2 | DIASTOLIC BLOOD PRESSURE: 66 MMHG | HEART RATE: 66 BPM | TEMPERATURE: 97.9 F | OXYGEN SATURATION: 97 % | HEIGHT: 68 IN | WEIGHT: 170 LBS

## 2023-09-18 LAB
BASOPHILS # BLD AUTO: 0.04 K/UL
BASOPHILS NFR BLD AUTO: 0.5 %
EOSINOPHIL # BLD AUTO: 0.37 K/UL
EOSINOPHIL NFR BLD AUTO: 4.9 %
FERRITIN SERPL-MCNC: 235 NG/ML
FOLATE SERPL-MCNC: 14 NG/ML
HCT VFR BLD CALC: 35.6 %
HGB BLD-MCNC: 11 G/DL
IMM GRANULOCYTES NFR BLD AUTO: 0.7 %
IRON SATN MFR SERPL: 25 %
IRON SERPL-MCNC: 97 UG/DL
LYMPHOCYTES # BLD AUTO: 1.69 K/UL
LYMPHOCYTES NFR BLD AUTO: 22.4 %
MAN DIFF?: NORMAL
MCHC RBC-ENTMCNC: 30 PG
MCHC RBC-ENTMCNC: 30.9 GM/DL
MCV RBC AUTO: 97 FL
MONOCYTES # BLD AUTO: 0.58 K/UL
MONOCYTES NFR BLD AUTO: 7.7 %
NEUTROPHILS # BLD AUTO: 4.81 K/UL
NEUTROPHILS NFR BLD AUTO: 63.8 %
PLATELET # BLD AUTO: 216 K/UL
RBC # BLD: 3.67 M/UL
RBC # FLD: 13.5 %
TIBC SERPL-MCNC: 382 UG/DL
UIBC SERPL-MCNC: 285 UG/DL
VIT B12 SERPL-MCNC: 293 PG/ML
WBC # FLD AUTO: 7.54 K/UL

## 2023-09-18 PROCEDURE — 99214 OFFICE O/P EST MOD 30 MIN: CPT

## 2023-09-29 ENCOUNTER — APPOINTMENT (OUTPATIENT)
Dept: MRI IMAGING | Facility: CLINIC | Age: 83
End: 2023-09-29
Payer: MEDICARE

## 2023-09-29 PROCEDURE — 72157 MRI CHEST SPINE W/O & W/DYE: CPT

## 2023-09-29 PROCEDURE — A9585: CPT

## 2023-09-29 PROCEDURE — 72158 MRI LUMBAR SPINE W/O & W/DYE: CPT

## 2023-10-07 ENCOUNTER — RX RENEWAL (OUTPATIENT)
Age: 83
End: 2023-10-07

## 2023-10-09 ENCOUNTER — RX RENEWAL (OUTPATIENT)
Age: 83
End: 2023-10-09

## 2023-10-13 ENCOUNTER — APPOINTMENT (OUTPATIENT)
Dept: ORTHOPEDIC SURGERY | Facility: CLINIC | Age: 83
End: 2023-10-13
Payer: MEDICARE

## 2023-10-13 VITALS — BODY MASS INDEX: 25.76 KG/M2 | HEIGHT: 68 IN | WEIGHT: 170 LBS

## 2023-10-13 PROCEDURE — 99205 OFFICE O/P NEW HI 60 MIN: CPT

## 2023-11-30 ENCOUNTER — APPOINTMENT (OUTPATIENT)
Dept: INTERNAL MEDICINE | Facility: CLINIC | Age: 83
End: 2023-11-30
Payer: MEDICARE

## 2023-11-30 ENCOUNTER — NON-APPOINTMENT (OUTPATIENT)
Age: 83
End: 2023-11-30

## 2023-11-30 VITALS — WEIGHT: 176 LBS | HEIGHT: 68 IN | HEART RATE: 68 BPM | OXYGEN SATURATION: 98 % | BODY MASS INDEX: 26.67 KG/M2

## 2023-11-30 PROCEDURE — 93000 ELECTROCARDIOGRAM COMPLETE: CPT

## 2023-11-30 PROCEDURE — 36415 COLL VENOUS BLD VENIPUNCTURE: CPT

## 2023-11-30 PROCEDURE — 99214 OFFICE O/P EST MOD 30 MIN: CPT | Mod: 25

## 2023-11-30 RX ORDER — CYCLOBENZAPRINE HYDROCHLORIDE 5 MG/1
5 TABLET, FILM COATED ORAL
Qty: 20 | Refills: 0 | Status: COMPLETED | COMMUNITY
Start: 2023-02-06 | End: 2023-11-30

## 2023-11-30 RX ORDER — TAMSULOSIN HYDROCHLORIDE 0.4 MG/1
0.4 CAPSULE ORAL
Qty: 90 | Refills: 3 | Status: ACTIVE | COMMUNITY
Start: 2021-04-28 | End: 1900-01-01

## 2023-11-30 RX ORDER — CLOPIDOGREL BISULFATE 75 MG/1
75 TABLET, FILM COATED ORAL
Qty: 90 | Refills: 3 | Status: COMPLETED | COMMUNITY
Start: 2019-02-07 | End: 2023-11-30

## 2023-11-30 RX ORDER — CHOLECALCIFEROL (VITAMIN D3) 25 MCG
25 MCG CAPSULE ORAL
Qty: 90 | Refills: 3 | Status: COMPLETED | COMMUNITY
Start: 2022-04-25 | End: 2023-11-30

## 2023-12-01 ENCOUNTER — APPOINTMENT (OUTPATIENT)
Dept: INTERNAL MEDICINE | Facility: CLINIC | Age: 83
End: 2023-12-01
Payer: MEDICARE

## 2023-12-01 ENCOUNTER — NON-APPOINTMENT (OUTPATIENT)
Age: 83
End: 2023-12-01

## 2023-12-01 PROCEDURE — 93306 TTE W/DOPPLER COMPLETE: CPT

## 2023-12-04 ENCOUNTER — NON-APPOINTMENT (OUTPATIENT)
Age: 83
End: 2023-12-04

## 2023-12-04 RX ORDER — SODIUM SULFATE, POTASSIUM SULFATE AND MAGNESIUM SULFATE 1.6; 3.13; 17.5 G/177ML; G/177ML; G/177ML
17.5-3.13-1.6 SOLUTION ORAL
Qty: 2 | Refills: 0 | Status: ACTIVE | COMMUNITY
Start: 2023-12-04 | End: 1900-01-01

## 2023-12-05 ENCOUNTER — APPOINTMENT (OUTPATIENT)
Dept: INTERNAL MEDICINE | Facility: CLINIC | Age: 83
End: 2023-12-05
Payer: MEDICARE

## 2023-12-05 VITALS
TEMPERATURE: 97.7 F | WEIGHT: 177 LBS | HEIGHT: 68 IN | OXYGEN SATURATION: 98 % | HEART RATE: 67 BPM | BODY MASS INDEX: 26.83 KG/M2

## 2023-12-05 VITALS — SYSTOLIC BLOOD PRESSURE: 122 MMHG | DIASTOLIC BLOOD PRESSURE: 66 MMHG

## 2023-12-05 VITALS — SYSTOLIC BLOOD PRESSURE: 120 MMHG | DIASTOLIC BLOOD PRESSURE: 62 MMHG

## 2023-12-05 DIAGNOSIS — I25.10 ATHEROSCLEROTIC HEART DISEASE OF NATIVE CORONARY ARTERY W/OUT ANGINA PECTORIS: ICD-10-CM

## 2023-12-05 LAB
ALBUMIN SERPL ELPH-MCNC: 4.3 G/DL
ALP BLD-CCNC: 40 U/L
ALT SERPL-CCNC: 17 U/L
ANION GAP SERPL CALC-SCNC: 12 MMOL/L
AST SERPL-CCNC: 18 U/L
BASOPHILS # BLD AUTO: 0.05 K/UL
BASOPHILS NFR BLD AUTO: 0.7 %
BILIRUB SERPL-MCNC: 0.4 MG/DL
BUN SERPL-MCNC: 27 MG/DL
CALCIUM SERPL-MCNC: 9.2 MG/DL
CHLORIDE SERPL-SCNC: 111 MMOL/L
CO2 SERPL-SCNC: 23 MMOL/L
CREAT SERPL-MCNC: 1.47 MG/DL
EGFR: 48 ML/MIN/1.73M2
EOSINOPHIL # BLD AUTO: 0.32 K/UL
EOSINOPHIL NFR BLD AUTO: 4.3 %
ESTIMATED AVERAGE GLUCOSE: 105 MG/DL
GLUCOSE SERPL-MCNC: 122 MG/DL
HBA1C MFR BLD HPLC: 5.3 %
HCT VFR BLD CALC: 35.6 %
HGB BLD-MCNC: 11.4 G/DL
IMM GRANULOCYTES NFR BLD AUTO: 0.7 %
LYMPHOCYTES # BLD AUTO: 1.35 K/UL
LYMPHOCYTES NFR BLD AUTO: 18.3 %
MAN DIFF?: NORMAL
MCHC RBC-ENTMCNC: 30 PG
MCHC RBC-ENTMCNC: 32 GM/DL
MCV RBC AUTO: 93.7 FL
MONOCYTES # BLD AUTO: 0.43 K/UL
MONOCYTES NFR BLD AUTO: 5.8 %
NEUTROPHILS # BLD AUTO: 5.18 K/UL
NEUTROPHILS NFR BLD AUTO: 70.2 %
PLATELET # BLD AUTO: 225 K/UL
POTASSIUM SERPL-SCNC: 4.1 MMOL/L
PROT SERPL-MCNC: 6.8 G/DL
RBC # BLD: 3.8 M/UL
RBC # FLD: 13.7 %
SODIUM SERPL-SCNC: 146 MMOL/L
WBC # FLD AUTO: 7.38 K/UL

## 2023-12-05 PROCEDURE — 99214 OFFICE O/P EST MOD 30 MIN: CPT | Mod: 25

## 2023-12-05 PROCEDURE — 36415 COLL VENOUS BLD VENIPUNCTURE: CPT

## 2023-12-05 PROCEDURE — 93000 ELECTROCARDIOGRAM COMPLETE: CPT

## 2023-12-06 DIAGNOSIS — N40.0 BENIGN PROSTATIC HYPERPLASIA WITHOUT LOWER URINARY TRACT SYMPMS: ICD-10-CM

## 2023-12-06 LAB
ALBUMIN SERPL ELPH-MCNC: 4.4 G/DL
ALP BLD-CCNC: 44 U/L
ALT SERPL-CCNC: 13 U/L
ANION GAP SERPL CALC-SCNC: 13 MMOL/L
AST SERPL-CCNC: 18 U/L
BASOPHILS # BLD AUTO: 0.04 K/UL
BASOPHILS NFR BLD AUTO: 0.5 %
BILIRUB SERPL-MCNC: 0.4 MG/DL
BUN SERPL-MCNC: 29 MG/DL
CALCIUM SERPL-MCNC: 9.3 MG/DL
CHLORIDE SERPL-SCNC: 109 MMOL/L
CO2 SERPL-SCNC: 23 MMOL/L
CREAT SERPL-MCNC: 1.45 MG/DL
EGFR: 48 ML/MIN/1.73M2
EOSINOPHIL # BLD AUTO: 0.35 K/UL
EOSINOPHIL NFR BLD AUTO: 4.4 %
GLUCOSE SERPL-MCNC: 113 MG/DL
HCT VFR BLD CALC: 37.7 %
HGB BLD-MCNC: 11.5 G/DL
IMM GRANULOCYTES NFR BLD AUTO: 0.4 %
LYMPHOCYTES # BLD AUTO: 1.71 K/UL
LYMPHOCYTES NFR BLD AUTO: 21.4 %
MAN DIFF?: NORMAL
MCHC RBC-ENTMCNC: 29.5 PG
MCHC RBC-ENTMCNC: 30.5 GM/DL
MCV RBC AUTO: 96.7 FL
MONOCYTES # BLD AUTO: 0.56 K/UL
MONOCYTES NFR BLD AUTO: 7 %
NEUTROPHILS # BLD AUTO: 5.3 K/UL
NEUTROPHILS NFR BLD AUTO: 66.3 %
NT-PROBNP SERPL-MCNC: 6081 PG/ML
PLATELET # BLD AUTO: 247 K/UL
POTASSIUM SERPL-SCNC: 5.1 MMOL/L
PROT SERPL-MCNC: 7 G/DL
RBC # BLD: 3.9 M/UL
RBC # FLD: 14.1 %
SODIUM SERPL-SCNC: 145 MMOL/L
WBC # FLD AUTO: 7.99 K/UL

## 2023-12-07 LAB
ALBUMIN MFR SERPL ELPH: 60.2 %
ALBUMIN SERPL-MCNC: 4.2 G/DL
ALBUMIN/GLOB SERPL: 1.5 RATIO
ALPHA1 GLOB MFR SERPL ELPH: 4.6 %
ALPHA1 GLOB SERPL ELPH-MCNC: 0.3 G/DL
ALPHA2 GLOB MFR SERPL ELPH: 8.1 %
ALPHA2 GLOB SERPL ELPH-MCNC: 0.6 G/DL
B-GLOBULIN MFR SERPL ELPH: 12.1 %
B-GLOBULIN SERPL ELPH-MCNC: 0.8 G/DL
DEPRECATED KAPPA LC FREE/LAMBDA SER: 2.23 RATIO
GAMMA GLOB FLD ELPH-MCNC: 1 G/DL
GAMMA GLOB MFR SERPL ELPH: 15 %
IGA SER QL IEP: 209 MG/DL
IGG SER QL IEP: 1021 MG/DL
IGM SER QL IEP: 36 MG/DL
INTERPRETATION SERPL IEP-IMP: NORMAL
KAPPA LC CSF-MCNC: 1.83 MG/DL
KAPPA LC SERPL-MCNC: 4.09 MG/DL
M PROTEIN SPEC IFE-MCNC: NORMAL
PROT SERPL-MCNC: 7 G/DL
PROT SERPL-MCNC: 7 G/DL

## 2023-12-08 ENCOUNTER — APPOINTMENT (OUTPATIENT)
Dept: ORTHOPEDIC SURGERY | Facility: CLINIC | Age: 83
End: 2023-12-08
Payer: MEDICARE

## 2023-12-08 PROCEDURE — 99214 OFFICE O/P EST MOD 30 MIN: CPT

## 2023-12-08 RX ORDER — GABAPENTIN 100 MG/1
100 CAPSULE ORAL
Qty: 60 | Refills: 2 | Status: ACTIVE | COMMUNITY
Start: 2023-10-13 | End: 1900-01-01

## 2023-12-11 ENCOUNTER — APPOINTMENT (OUTPATIENT)
Dept: PAIN MANAGEMENT | Facility: CLINIC | Age: 83
End: 2023-12-11
Payer: MEDICARE

## 2023-12-11 ENCOUNTER — APPOINTMENT (OUTPATIENT)
Dept: INTERNAL MEDICINE | Facility: AMBULATORY MEDICAL SERVICES | Age: 83
End: 2023-12-11

## 2023-12-11 VITALS — WEIGHT: 170 LBS | BODY MASS INDEX: 25.76 KG/M2 | HEIGHT: 68 IN

## 2023-12-11 PROCEDURE — 99204 OFFICE O/P NEW MOD 45 MIN: CPT

## 2023-12-13 ENCOUNTER — APPOINTMENT (OUTPATIENT)
Dept: INTERNAL MEDICINE | Facility: CLINIC | Age: 83
End: 2023-12-13
Payer: MEDICARE

## 2023-12-13 VITALS — HEART RATE: 68 BPM | OXYGEN SATURATION: 98 % | WEIGHT: 170 LBS | HEIGHT: 68 IN | BODY MASS INDEX: 25.76 KG/M2

## 2023-12-13 VITALS — SYSTOLIC BLOOD PRESSURE: 120 MMHG | DIASTOLIC BLOOD PRESSURE: 70 MMHG

## 2023-12-13 DIAGNOSIS — M54.50 LOW BACK PAIN, UNSPECIFIED: ICD-10-CM

## 2023-12-13 PROCEDURE — 99214 OFFICE O/P EST MOD 30 MIN: CPT | Mod: 25

## 2023-12-13 PROCEDURE — 36415 COLL VENOUS BLD VENIPUNCTURE: CPT

## 2023-12-13 NOTE — HISTORY OF PRESENT ILLNESS
[FreeTextEntry1] : Here to follow ongoing conditions [de-identified] : Seen today with son who is a reliable historian and we will do future contact and change of medications through his son Medication list and reasons for each medication reviewed with son in detail Significant change in echocardiogram with decreased LV function and significant increase in right-sided pressures Was found to have edema on exam last week and furosemide was begun BNP was elevated over 6000 Mild renal insufficiency Abnormal immunoelectrophoresis Very limited in quality of life and activities by lower back pain issues and is scheduled for an epidural on December 15, 2023 He is off his Plavix as of last week and held his aspirin as of December 11 Change in bowel habits reason for doing colonoscopy, Colonoscopy was postponed by me no chest pain no chest pressure no shortness of breath He is not a reliable historian due to his mild dementia plays golf 18 holes walks the course no issues, not in last few weeks due to cold weather memory issues, cognitive decline, sees neurologist Creatinine also a bit elevated on last test that has some anemia s/p cholecystectomy in 2017 with gangrenous gallbladder s/p coronary artery bypass grafting x 5 in 2002 Thallium stress test 1/2017 showed no significant ischemia but an ejection fraction of 44% echocardiography 1/2019 showed small inferobasal hypokinesis with an estimated ejection fraction in the 55% - 60 % range and moderate aortic insufficiency echo 6/2021 EF 50-55%, inferoseptal and basal inferior hypokinesis, 2 + AI, mild AS echo 8/2022 LVEF 55-60% moderate AI ? mild AS hyperlipidemia on both a statin and fibrate with no muscle side effects past history of prostate cancer and sees his urologist regularly, recent rectal ok, declines rectal today

## 2023-12-13 NOTE — PLAN
[FreeTextEntry1] : At this time he is medically and cardiologically stable and cleared for an epidural injection and he should restart his baby aspirin 24 hours after procedure I will follow him up next week and then we will proceed with considering elective coronary catheterization

## 2023-12-13 NOTE — PHYSICAL EXAM
Scheduled pt for Lt L3 L4 Tfesi for 3/26/21  Pt denies rx blood thinners  Went over pre-procedure instructions below:  Nothing to eat or drink 1 hr prior to procedure  Need to arrange transportation  Proper clothing for procedure  If ill or placed on antibiotics please call to reschedule  Covid/travel/ and vaccine instructions [No Acute Distress] : no acute distress [Well Nourished] : well nourished [Well Developed] : well developed [Well-Appearing] : well-appearing [Normal Sclera/Conjunctiva] : normal sclera/conjunctiva [PERRL] : pupils equal round and reactive to light [EOMI] : extraocular movements intact [Normal Outer Ear/Nose] : the outer ears and nose were normal in appearance [Normal Oropharynx] : the oropharynx was normal [No JVD] : no jugular venous distention [No Lymphadenopathy] : no lymphadenopathy [Supple] : supple [Thyroid Normal, No Nodules] : the thyroid was normal and there were no nodules present [No Respiratory Distress] : no respiratory distress  [No Accessory Muscle Use] : no accessory muscle use [Clear to Auscultation] : lungs were clear to auscultation bilaterally [Normal Rate] : normal rate  [Regular Rhythm] : with a regular rhythm [Normal S1, S2] : normal S1 and S2 [Distant] : the heart sounds were distant [I] : a grade 1 [No Carotid Bruits] : no carotid bruits [No Abdominal Bruit] : a ~M bruit was not heard ~T in the abdomen [No Varicosities] : no varicosities [Pedal Pulses Present] : the pedal pulses are present [No Palpable Aorta] : no palpable aorta [No Extremity Clubbing/Cyanosis] : no extremity clubbing/cyanosis [___ +] : bilateral [unfilled]U+ pretibial pitting edema [Soft] : abdomen soft [Non Tender] : non-tender [Non-distended] : non-distended [No Masses] : no abdominal mass palpated [No HSM] : no HSM [Normal Bowel Sounds] : normal bowel sounds [Normal Posterior Cervical Nodes] : no posterior cervical lymphadenopathy [Normal Anterior Cervical Nodes] : no anterior cervical lymphadenopathy [No CVA Tenderness] : no CVA  tenderness [No Spinal Tenderness] : no spinal tenderness [No Joint Swelling] : no joint swelling [Grossly Normal Strength/Tone] : grossly normal strength/tone [No Rash] : no rash [Coordination Grossly Intact] : coordination grossly intact [No Focal Deficits] : no focal deficits [Normal Affect] : the affect was normal [Normal Insight/Judgement] : insight and judgment were intact

## 2023-12-13 NOTE — ASSESSMENT
[FreeTextEntry1] : decreased LV systolic function compared to 2022 no symptoms no known events with elevated right sided pressures since 2022 Nice response with much less edema on low-dose furosemide patient also notices improvement there is a 7 pound weight loss from last week dementia sees neurologist Jose Valdes Mild elevation in creatinine Suggest best course of action for patient who is severely limited by his back pain and essentially crippled by this is to do the epidural injection on December 15 24 hours after procedure he can restart his aspirin We will follow him up next week on Monday, December 18 and at that time we will arrange electively to evaluate his bypass graft status Bloods drawn to check his renal function and potassium

## 2023-12-14 LAB
ANION GAP SERPL CALC-SCNC: 13 MMOL/L
BASOPHILS # BLD AUTO: 0.07 K/UL
BASOPHILS NFR BLD AUTO: 1 %
BUN SERPL-MCNC: 41 MG/DL
CALCIUM SERPL-MCNC: 9.7 MG/DL
CHLORIDE SERPL-SCNC: 102 MMOL/L
CO2 SERPL-SCNC: 27 MMOL/L
CREAT SERPL-MCNC: 1.41 MG/DL
EGFR: 50 ML/MIN/1.73M2
EOSINOPHIL # BLD AUTO: 0.39 K/UL
EOSINOPHIL NFR BLD AUTO: 5.6 %
GLUCOSE SERPL-MCNC: 78 MG/DL
HCT VFR BLD CALC: 41.2 %
HGB BLD-MCNC: 12.4 G/DL
IMM GRANULOCYTES NFR BLD AUTO: 0.6 %
LYMPHOCYTES # BLD AUTO: 1.62 K/UL
LYMPHOCYTES NFR BLD AUTO: 23.4 %
MAN DIFF?: NORMAL
MCHC RBC-ENTMCNC: 28.6 PG
MCHC RBC-ENTMCNC: 30.1 GM/DL
MCV RBC AUTO: 94.9 FL
MONOCYTES # BLD AUTO: 0.63 K/UL
MONOCYTES NFR BLD AUTO: 9.1 %
NEUTROPHILS # BLD AUTO: 4.16 K/UL
NEUTROPHILS NFR BLD AUTO: 60.3 %
NT-PROBNP SERPL-MCNC: 1968 PG/ML
PLATELET # BLD AUTO: 254 K/UL
POTASSIUM SERPL-SCNC: 5 MMOL/L
RBC # BLD: 4.34 M/UL
RBC # FLD: 14 %
SODIUM SERPL-SCNC: 142 MMOL/L
VIT B12 SERPL-MCNC: 821 PG/ML
WBC # FLD AUTO: 6.91 K/UL

## 2023-12-15 ENCOUNTER — APPOINTMENT (OUTPATIENT)
Dept: PAIN MANAGEMENT | Facility: CLINIC | Age: 83
End: 2023-12-15
Payer: MEDICARE

## 2023-12-15 PROCEDURE — 64483 NJX AA&/STRD TFRM EPI L/S 1: CPT

## 2023-12-15 PROCEDURE — 64484 NJX AA&/STRD TFRM EPI L/S EA: CPT

## 2023-12-15 NOTE — PROCEDURE
[FreeTextEntry1] : RIGHT L4-L5, L5-S1 transforaminal epidural steroid injection [FreeTextEntry2] : right lumbar radiculopathy [FreeTextEntry3] : Procedure Date: 12/15/2023   Preoperative Diagnosis: chronic low back pain with RIGHT sided sciatica   Procedure: RIGHT L4-L5, L5-S1 transforaminal epidural steroid injection under fluoroscopic guidance   Anesthesia: local   Complications: none   EBL: none   Procedure in detail: Patient was seen and examined. Risks, benefits, and alternatives for the procedure were discussed with the patient in detail. The patient expressed understanding, gave written and verbal consent, and placed themselves in a prone position on the procedure table. Skin overlying the lumbosacral spine was prepped with chloraprep and draped in the usual sterile fashion. Fluoroscopic images were obtained to identify the L4 and L5 vertebral body. Target was the 6 o'clock position under the RIGHT pedicle at both the L4 and L5 vertebral bodies. Skin overlying the targets was marked and infiltrated with 1% lidocaine. Using two 25 gauge, 5 inch spinal needles, these were inserted and advanced under intermittent fluoroscopic guidance. When felt to be engaged in the epidural space, lateral view was used to confirm depth. After negative aspiration for heme/csf, contrast was injected under live fluoroscopy through each needle, both of which showed contrast spread consistent with epidural flow. No evidence of intravascular or intrathecal uptake. At this point, a total of 2ml of injectate, which consisted of 1ml of 10mg/ml dexamethasone and 1ml of normal saline was injected through each needle. The needles were restyletted and withdrawn, a band aid was placed over the injection site. Patient tolerated the procedure well. The patient recovered uneventfully and was discharged home in stable condition.

## 2023-12-18 ENCOUNTER — APPOINTMENT (OUTPATIENT)
Dept: INTERNAL MEDICINE | Facility: CLINIC | Age: 83
End: 2023-12-18
Payer: MEDICARE

## 2023-12-18 VITALS — SYSTOLIC BLOOD PRESSURE: 118 MMHG | DIASTOLIC BLOOD PRESSURE: 70 MMHG

## 2023-12-18 VITALS — WEIGHT: 168 LBS | OXYGEN SATURATION: 98 % | HEART RATE: 71 BPM | HEIGHT: 68 IN | BODY MASS INDEX: 25.46 KG/M2

## 2023-12-18 PROCEDURE — 99214 OFFICE O/P EST MOD 30 MIN: CPT | Mod: 25

## 2023-12-18 PROCEDURE — 36415 COLL VENOUS BLD VENIPUNCTURE: CPT

## 2023-12-18 NOTE — HISTORY OF PRESENT ILLNESS
[FreeTextEntry1] : Here to follow ongoing conditions [de-identified] : Seen 12/13/23 with son who is a reliable historian and we will do future contact and change of medications through his son Medication list and reasons for each medication reviewed with son in detail Significant change in echocardiogram with decreased LV function and significant increase in right-sided pressures Was found to have edema on exam and furosemide was begun BNP was elevated over 6000 Mild renal insufficiency Abnormal immunoelectrophoresis Very limited in quality of life and activities by lower back pain issues  epidural on December 15, 2023 He is off his Plavix as of last week and held his aspirin as of December 11 has epidural 12/15/23 no issues, maybe a little better  BNP much better creatinine a bit up to recheck today  Change in bowel habits reason for doing colonoscopy, Colonoscopy was postponed by me no chest pain no chest pressure no shortness of breath He is not a reliable historian due to his mild dementia plays golf 18 holes walks the course no issues, not in last few weeks due to cold weather memory issues, cognitive decline, sees neurologist Creatinine also a bit elevated on last test that has some anemia s/p cholecystectomy in 2017 with gangrenous gallbladder s/p coronary artery bypass grafting x 5 in 2002 Thallium stress test 1/2017 showed no significant ischemia but an ejection fraction of 44% echocardiography 1/2019 showed small inferobasal hypokinesis with an estimated ejection fraction in the 55% - 60 % range and moderate aortic insufficiency echo 6/2021 EF 50-55%, inferoseptal and basal inferior hypokinesis, 2 + AI, mild AS echo 8/2022 LVEF 55-60% moderate AI ? mild AS hyperlipidemia on both a statin and fibrate with no muscle side effects past history of prostate cancer and sees his urologist regularly, recent rectal ok, declines rectal today

## 2023-12-18 NOTE — PHYSICAL EXAM
[No Acute Distress] : no acute distress [Well Nourished] : well nourished [Well Developed] : well developed [Well-Appearing] : well-appearing [Normal Sclera/Conjunctiva] : normal sclera/conjunctiva [PERRL] : pupils equal round and reactive to light [EOMI] : extraocular movements intact [Normal Outer Ear/Nose] : the outer ears and nose were normal in appearance [Normal Oropharynx] : the oropharynx was normal [No JVD] : no jugular venous distention [No Lymphadenopathy] : no lymphadenopathy [Supple] : supple [Thyroid Normal, No Nodules] : the thyroid was normal and there were no nodules present [No Respiratory Distress] : no respiratory distress  [No Accessory Muscle Use] : no accessory muscle use [Clear to Auscultation] : lungs were clear to auscultation bilaterally [Normal Rate] : normal rate  [Regular Rhythm] : with a regular rhythm [Normal S1, S2] : normal S1 and S2 [Distant] : the heart sounds were distant [I] : a grade 1 [No Carotid Bruits] : no carotid bruits [No Abdominal Bruit] : a ~M bruit was not heard ~T in the abdomen [No Varicosities] : no varicosities [Pedal Pulses Present] : the pedal pulses are present [No Edema] : there was no peripheral edema [No Palpable Aorta] : no palpable aorta [No Extremity Clubbing/Cyanosis] : no extremity clubbing/cyanosis [Soft] : abdomen soft [Non Tender] : non-tender [Non-distended] : non-distended [No Masses] : no abdominal mass palpated [No HSM] : no HSM [Normal Bowel Sounds] : normal bowel sounds [Normal Posterior Cervical Nodes] : no posterior cervical lymphadenopathy [Normal Anterior Cervical Nodes] : no anterior cervical lymphadenopathy [No CVA Tenderness] : no CVA  tenderness [No Spinal Tenderness] : no spinal tenderness [No Joint Swelling] : no joint swelling [Grossly Normal Strength/Tone] : grossly normal strength/tone [No Rash] : no rash [Coordination Grossly Intact] : coordination grossly intact [No Focal Deficits] : no focal deficits [Normal Affect] : the affect was normal [Normal Insight/Judgement] : insight and judgment were intact

## 2023-12-18 NOTE — ASSESSMENT
[FreeTextEntry1] : decreased LV systolic function compared to 2022 no symptoms no known events with elevated right sided pressures since 2022 Nice response with much less edema on low-dose furosemide patient also notices improvement there is a 7 pound weight loss from last week dementia sees neurologist Jose Valdes Mild elevation in creatinine suggest cath to electively to evaluate his bypass graft status

## 2023-12-19 LAB
ALBUMIN SERPL ELPH-MCNC: 4.6 G/DL
ALP BLD-CCNC: 42 U/L
ALT SERPL-CCNC: 16 U/L
ANION GAP SERPL CALC-SCNC: 10 MMOL/L
AST SERPL-CCNC: 22 U/L
BASOPHILS # BLD AUTO: 0.08 K/UL
BASOPHILS NFR BLD AUTO: 1 %
BILIRUB SERPL-MCNC: 0.6 MG/DL
BUN SERPL-MCNC: 41 MG/DL
CALCIUM SERPL-MCNC: 9.8 MG/DL
CHLORIDE SERPL-SCNC: 104 MMOL/L
CO2 SERPL-SCNC: 26 MMOL/L
CREAT SERPL-MCNC: 1.42 MG/DL
EGFR: 50 ML/MIN/1.73M2
EOSINOPHIL # BLD AUTO: 0.5 K/UL
EOSINOPHIL NFR BLD AUTO: 6 %
GLUCOSE SERPL-MCNC: 89 MG/DL
HCT VFR BLD CALC: 43.1 %
HGB BLD-MCNC: 13 G/DL
IMM GRANULOCYTES NFR BLD AUTO: 0.7 %
LYMPHOCYTES # BLD AUTO: 1.77 K/UL
LYMPHOCYTES NFR BLD AUTO: 21.3 %
MAN DIFF?: NORMAL
MCHC RBC-ENTMCNC: 28.8 PG
MCHC RBC-ENTMCNC: 30.2 GM/DL
MCV RBC AUTO: 95.4 FL
MONOCYTES # BLD AUTO: 0.65 K/UL
MONOCYTES NFR BLD AUTO: 7.8 %
NEUTROPHILS # BLD AUTO: 5.26 K/UL
NEUTROPHILS NFR BLD AUTO: 63.2 %
NT-PROBNP SERPL-MCNC: 1456 PG/ML
PLATELET # BLD AUTO: 282 K/UL
POTASSIUM SERPL-SCNC: 4.8 MMOL/L
PROT SERPL-MCNC: 7.4 G/DL
RBC # BLD: 4.52 M/UL
RBC # FLD: 13.9 %
SODIUM SERPL-SCNC: 141 MMOL/L
WBC # FLD AUTO: 8.32 K/UL

## 2023-12-29 ENCOUNTER — APPOINTMENT (OUTPATIENT)
Dept: PAIN MANAGEMENT | Facility: CLINIC | Age: 83
End: 2023-12-29
Payer: MEDICARE

## 2023-12-29 VITALS — HEIGHT: 68 IN | WEIGHT: 168 LBS | BODY MASS INDEX: 25.46 KG/M2

## 2023-12-29 PROCEDURE — 99213 OFFICE O/P EST LOW 20 MIN: CPT

## 2023-12-29 NOTE — DATA REVIEWED
[MRI] : MRI [Lumbar Spine] : lumbar spine [Report was reviewed and noted in the chart] : The report was reviewed and noted in the chart [I independently reviewed and interpreted images and report] : I independently reviewed and interpreted images and report [FreeTextEntry1] : Mild thoracic spondylosis without spinal canal or foraminal stenosis. Lumbar spondylosis, similar to L4/L5, resulting in right lateral recess narrowing and severe left foraminal narrowing. Degenerative endplate edema at L2/L3 and L4/L5. L1/L2: No spinal canal foraminal narrowing. L2/L3: Disc bulge resulting mild spinal canal narrowing and mild foraminal narrowing. L3/L4: Small disc bulge resulting in indentation of the thecal sac, mild narrowing of the left lateral recess and mild bilateral foraminal narrowing. L4/L5: Disc bulge with superimposed right lateral recess/proximal foraminal extrusion. Bilateral facet arthrosis, right greater than left with thickening of the ligamentum flavum on the right. Findings result in narrowing of the right lateral recess and mild left and severe right foraminal narrowing. L5/S1: No spinal canal foraminal narrowing. Bilateral facet arthrosis, left greater than right. Tiny ligamentum flavum cysts on the right without spinal canal or foraminal narrowing.  Ind. review- NF stenosis most notable L2/3 and severe on R L4/5 with extruded HNP

## 2023-12-29 NOTE — PHYSICAL EXAM
[de-identified] : Gen: NAD Head: NC/AT Eyes: no glasses, no scleral icterus ENT: mucous membranes moist CV: RRR, S1 S2, no mrg Lungs: CTAB, nonlabored breathing Abd: soft, NT/ND Ext: full ROM in all extremities, no peripheral edema Back: limited extension 2/2 pain, +SLR on the right Neuro: CN intact LEs +5 L +5 R hip flexion +5 L +5 R leg extension +5 L +5 R leg flexion +5 L +5 R foot dorsiflexion +5 L +5 R foot plantarflexion +5 L +5 R EHL extension Psych: normal affect Skin: no visible lesions

## 2023-12-29 NOTE — DISCUSSION/SUMMARY
[de-identified] : AIYANA VARELA is a 83 year-old man presenting for a RPV for a history of chronic low back pain with RLE radicular features.   Prior treatment: 12/15/2023: RIGHT L4-L5, L5-S1 TFESI w/ 50% relief of pain and improvement in function Gabapentin OTC NSAIDs  Plan: 1) MRI lumbar spine images reviewed with the patient. 2) May consider repeat RIGHT L4-L5, L5-S1 TFESI in the future if needed 3) Discussed treatment options, patient has not been engaged in a regular stretching regimen. Provide PT script.   4) Continue gabapentin 100mg/200mg daily; denies AEs at this time, including sedation/gait unsteadiness 5) Continue home exercise regimen 6) Continue OTC NSAIDs 7) RTC 2 months

## 2023-12-29 NOTE — HISTORY OF PRESENT ILLNESS
[Lower back] : lower back [Right Leg] : right leg [10] : 10 [Dull/Aching] : dull/aching [Radiating] : radiating [Shooting] : shooting [Stabbing] : stabbing [Throbbing] : throbbing [Intermittent] : intermittent [Household chores] : household chores [Leisure] : leisure [Social interactions] : social interactions [Meds] : meds [Sitting] : sitting [Standing] : standing [Walking] : walking [Lying in bed] : lying in bed [7] : 7 [FreeTextEntry1] : 12/29/2023  AIYANA VARELA is a 83 year-old man presenting for a RPV for a history of chronic low back pain with RLE radicular features. Patient underwent a RIGHT L4-5 L5-S1 TFESI on 12/15/2023. The patient notes having 50% relief of his pain since the procedure and notes improvement of function--particularly with standing and walking. He notes significant improvement of radicular pain in the RLE. He gets occasional tingling and numbness in the right posterior thigh and leg. No focal weakness in the lower extremities. No bowel or bladder incontinence or saddle anesthesia.  The patient states that average pain over the past week was 5/10 in severity. Mood: No anxiety or depression.  Sleep: No difficulty with sleep 2/2 pain.  12/11/2023: AIYANA VARELA is a 83 year-old man presenting for a NPV for a history of chronic low back pain with RLE radicular features. The patient notes having pain over the past year which has gotten significantly worse over the past 2-3 months. No history of trauma or focal injury. At this point, the patient endorses having an aching pain in the right low lumbosacral region with radiation to the right posterolateral thigh and lateral leg to the dorsal foot. No focal numbness or weakness in the lower extremities. No bowel or bladder incontinence or saddle anesthesia. Pain is worse with standing and walking.  The patient states that average pain over the past week was 10/10 in severity. Sleep: No difficulty with sleep initiation or maintenance at this time.  [] : no [de-identified] : MRI

## 2024-03-01 ENCOUNTER — APPOINTMENT (OUTPATIENT)
Dept: PAIN MANAGEMENT | Facility: CLINIC | Age: 84
End: 2024-03-01
Payer: MEDICARE

## 2024-03-01 VITALS — BODY MASS INDEX: 25.91 KG/M2 | HEIGHT: 68 IN | WEIGHT: 171 LBS

## 2024-03-01 PROCEDURE — 99213 OFFICE O/P EST LOW 20 MIN: CPT

## 2024-03-01 NOTE — DISCUSSION/SUMMARY
[de-identified] : AIYANA VARELA is a 83 year-old man presenting for a RPV for a history of chronic low back pain with RLE radicular features.   Prior treatment: 12/15/2023: RIGHT L4-L5, L5-S1 TFESI w/ 50% relief of pain and improvement in function Gabapentin OTC NSAIDs  Plan: 1) MRI lumbar spine images reviewed with the patient. 2) May consider repeat RIGHT L4-L5, L5-S1 TFESI in the future if needed 3) Discussed treatment options, patient has not been engaged in a regular stretching regimen. Had an extensive discussion with the patient regarding the importance of establishing a daily stretching routine. 4) Continue ibuprofen OTC and 800mg prn; patient denies AEs 5) Continue exercise routine at home 6) RTC 2 months

## 2024-03-01 NOTE — PHYSICAL EXAM
[de-identified] : Gen: NAD Head: NC/AT Eyes: no glasses, no scleral icterus ENT: mucous membranes moist CV: RRR, S1 S2, no mrg Lungs: CTAB, nonlabored breathing Abd: soft, NT/ND Ext: full ROM in all extremities, no peripheral edema Back: limited extension 2/2 pain, +SLR on the right Neuro: CN intact LEs +5 L +5 R hip flexion +5 L +5 R leg extension +5 L +5 R leg flexion +5 L +5 R foot dorsiflexion +5 L +5 R foot plantarflexion +5 L +5 R EHL extension Psych: normal affect Skin: no visible lesions

## 2024-03-01 NOTE — HISTORY OF PRESENT ILLNESS
[Lower back] : lower back [Right Leg] : right leg [7] : 7 [Dull/Aching] : dull/aching [Radiating] : radiating [Shooting] : shooting [Stabbing] : stabbing [Throbbing] : throbbing [Intermittent] : intermittent [Household chores] : household chores [Leisure] : leisure [Social interactions] : social interactions [Meds] : meds [Sitting] : sitting [Standing] : standing [Walking] : walking [Lying in bed] : lying in bed [Sleep] : sleep [FreeTextEntry1] : 3/1/2024: AIYANA VARELA is a 83 year-old man presenting for a RPV for a history of chronic low back pain with RLE radicular features. Overall, the patient feels that his pain has been stable and manageable since his RIGHT L4-L5, L5-S1 TFESI on 12/15/2023. The patient notes ongoing intermittent pain in the right low back with radiation to the right gluteal region, right posterolateral leg and dorsal foot. He notes intermittent tingling and numbness. No focal weakness. No bowel or bladder incontinence or saddle anesthesia. The patient notes that his pain is worst first thing in the morning and gradually improves throughout the day.  The patient states that average pain over the past week was 5/10 in severity. Mood: Patient denies anxiety or depression.  Sleep: Patient denies having difficulty with sleep initiation or maintenance 2/2 pain.   12/29/2023  AIYANA VARELA is a 83 year-old man presenting for a RPV for a history of chronic low back pain with RLE radicular features. Patient underwent a RIGHT L4-5 L5-S1 TFESI on 12/15/2023. The patient notes having 50% relief of his pain since the procedure and notes improvement of function--particularly with standing and walking. He notes significant improvement of radicular pain in the RLE. He gets occasional tingling and numbness in the right posterior thigh and leg. No focal weakness in the lower extremities. No bowel or bladder incontinence or saddle anesthesia.  The patient states that average pain over the past week was 5/10 in severity. Mood: No anxiety or depression.  Sleep: No difficulty with sleep 2/2 pain.  12/11/2023: AIYANA VARELA is a 83 year-old man presenting for a NPV for a history of chronic low back pain with RLE radicular features. The patient notes having pain over the past year which has gotten significantly worse over the past 2-3 months. No history of trauma or focal injury. At this point, the patient endorses having an aching pain in the right low lumbosacral region with radiation to the right posterolateral thigh and lateral leg to the dorsal foot. No focal numbness or weakness in the lower extremities. No bowel or bladder incontinence or saddle anesthesia. Pain is worse with standing and walking.  The patient states that average pain over the past week was 10/10 in severity. Sleep: No difficulty with sleep initiation or maintenance at this time.  [0] : 0 [9] : 9 [] : no [de-identified] : waking up in the morning [FreeTextEntry7] : b/l legs  [de-identified] : MRI

## 2024-03-14 ENCOUNTER — RX RENEWAL (OUTPATIENT)
Age: 84
End: 2024-03-14

## 2024-03-14 RX ORDER — FUROSEMIDE 20 MG/1
20 TABLET ORAL
Qty: 90 | Refills: 3 | Status: ACTIVE | COMMUNITY
Start: 2023-12-05 | End: 1900-01-01

## 2024-03-27 ENCOUNTER — APPOINTMENT (OUTPATIENT)
Dept: INTERNAL MEDICINE | Facility: CLINIC | Age: 84
End: 2024-03-27
Payer: MEDICARE

## 2024-03-27 VITALS
OXYGEN SATURATION: 97 % | TEMPERATURE: 97.6 F | BODY MASS INDEX: 25.01 KG/M2 | WEIGHT: 165 LBS | HEIGHT: 68 IN | SYSTOLIC BLOOD PRESSURE: 129 MMHG | HEART RATE: 81 BPM | DIASTOLIC BLOOD PRESSURE: 72 MMHG

## 2024-03-27 DIAGNOSIS — Z12.11 ENCOUNTER FOR SCREENING FOR MALIGNANT NEOPLASM OF COLON: ICD-10-CM

## 2024-03-27 DIAGNOSIS — I35.1 NONRHEUMATIC AORTIC (VALVE) INSUFFICIENCY: ICD-10-CM

## 2024-03-27 PROCEDURE — 99213 OFFICE O/P EST LOW 20 MIN: CPT

## 2024-03-27 RX ORDER — CHOLESTYRAMINE 4 G/9G
4 POWDER, FOR SUSPENSION ORAL TWICE DAILY
Qty: 60 | Refills: 3 | Status: ACTIVE | COMMUNITY
Start: 2024-03-27 | End: 1900-01-01

## 2024-03-27 NOTE — ASSESSMENT
[FreeTextEntry1] : avoid fatty oily foods. Check anemia w/u   May need a repeat colonoscopy  Try treating for bile malabsorption

## 2024-03-27 NOTE — HISTORY OF PRESENT ILLNESS
[FreeTextEntry1] : Few months ago after taking a walk- he has incontinence of stool. happened again a few times over past few months. In between no problems. He normally has 1-2 BM's a day.  he never came in for his colonoscopy- Now diarrhea has worsened  H/o CABG x5 in 2002  , high cholesterol,   H/o cholecystectomy years ago 2017. gangrenous  his bloods show mild anemia  but most recent CBC was normal  Last colonoscopy 1/2014.

## 2024-03-27 NOTE — PHYSICAL EXAM

## 2024-04-01 ENCOUNTER — NON-APPOINTMENT (OUTPATIENT)
Age: 84
End: 2024-04-01

## 2024-04-01 ENCOUNTER — APPOINTMENT (OUTPATIENT)
Dept: INTERNAL MEDICINE | Facility: CLINIC | Age: 84
End: 2024-04-01
Payer: MEDICARE

## 2024-04-01 VITALS
WEIGHT: 165 LBS | OXYGEN SATURATION: 98 % | BODY MASS INDEX: 25.01 KG/M2 | DIASTOLIC BLOOD PRESSURE: 60 MMHG | SYSTOLIC BLOOD PRESSURE: 160 MMHG | HEART RATE: 76 BPM | HEIGHT: 68 IN

## 2024-04-01 VITALS — SYSTOLIC BLOOD PRESSURE: 134 MMHG | DIASTOLIC BLOOD PRESSURE: 70 MMHG

## 2024-04-01 PROCEDURE — 99214 OFFICE O/P EST MOD 30 MIN: CPT

## 2024-04-01 PROCEDURE — G2211 COMPLEX E/M VISIT ADD ON: CPT

## 2024-04-01 PROCEDURE — 36415 COLL VENOUS BLD VENIPUNCTURE: CPT

## 2024-04-01 PROCEDURE — 93242 EXT ECG>48HR<7D RECORDING: CPT

## 2024-04-01 PROCEDURE — 93000 ELECTROCARDIOGRAM COMPLETE: CPT | Mod: 59

## 2024-04-01 RX ORDER — ATORVASTATIN CALCIUM 20 MG/1
20 TABLET, FILM COATED ORAL
Qty: 90 | Refills: 3 | Status: ACTIVE | COMMUNITY
Start: 2019-02-07 | End: 1900-01-01

## 2024-04-01 NOTE — HISTORY OF PRESENT ILLNESS
[FreeTextEntry1] : Here to follow ongoing conditions and to evaluate if patient can be cardiologically cleared for colonoscopy or endoscopy [de-identified] : no symptoms, stable above average functional status now since on cholestyramine he is constipated ecg here shows atrial fibrillation good ventricular rate, no symptoms Last seen December 18 with nice response to diuretic with improvement of edema Did not have any further cardiology evaluation and is here for follow-up Seen 12/13/23 with son who is a reliable historian and we will do future contact and change of medications through his son Medication list and reasons for each medication reviewed with son in detail Significant change in echocardiogram with decreased LV function and significant increase in right-sided pressures Was found to have edema on exam and furosemide was begun BNP was elevated over 6000 Mild renal insufficiency Abnormal immunoelectrophoresis Very limited in quality of life and activities by lower back pain issues  epidural on December 15, 2023 He is off his Plavix as of last week and held his aspirin as of December 11 has epidural 12/15/23 no issues, maybe a little better  BNP much better creatinine a bit up to recheck today  Change in bowel habits reason for doing colonoscopy, Colonoscopy was postponed by me no chest pain no chest pressure no shortness of breath He is not a reliable historian due to his mild dementia plays golf 18 holes walks the course no issues, not in last few weeks due to cold weather memory issues, cognitive decline, sees neurologist Creatinine also a bit elevated on last test that has some anemia s/p cholecystectomy in 2017 with gangrenous gallbladder s/p coronary artery bypass grafting x 5 in 2002 Thallium stress test 1/2017 showed no significant ischemia but an ejection fraction of 44% echocardiography 1/2019 showed small inferobasal hypokinesis with an estimated ejection fraction in the 55% - 60 % range and moderate aortic insufficiency echo 6/2021 EF 50-55%, inferoseptal and basal inferior hypokinesis, 2 + AI, mild AS echo 8/2022 LVEF 55-60% moderate AI ? mild AS hyperlipidemia on both a statin and fibrate with no muscle side effects past history of prostate cancer and sees his urologist regularly, recent rectal ok, declines rectal today

## 2024-04-01 NOTE — ASSESSMENT
[FreeTextEntry1] : new onset atrial fibrillation no symptoms since last visit 12/2023 echocardiogram decreased LV systolic function compared to 2022 no symptoms no known events with elevated right sided pressures since 2022 Nice response with much less edema on low-dose furosemide patient also noticed improvement dementia sees neurologist Jose Valdes Mild elevation in creatinine Loose stools and periodic incontinence and diarrhea has resolved completely on cholestyramine and now is constipated

## 2024-04-01 NOTE — PHYSICAL EXAM
[No Acute Distress] : no acute distress [Well Nourished] : well nourished [Well Developed] : well developed [Normal Sclera/Conjunctiva] : normal sclera/conjunctiva [Normal Outer Ear/Nose] : the outer ears and nose were normal in appearance [Normal Oropharynx] : the oropharynx was normal [No JVD] : no jugular venous distention [No Lymphadenopathy] : no lymphadenopathy [Supple] : supple [Thyroid Normal, No Nodules] : the thyroid was normal and there were no nodules present [No Respiratory Distress] : no respiratory distress  [No Accessory Muscle Use] : no accessory muscle use [Clear to Auscultation] : lungs were clear to auscultation bilaterally [Normal Rate] : normal rate  [Normal S1, S2] : normal S1 and S2 [Irregularly Irregular] : irregularly irregular [Distant] : the heart sounds were distant [I] : a grade 1 [No Carotid Bruits] : no carotid bruits [No Abdominal Bruit] : a ~M bruit was not heard ~T in the abdomen [No Varicosities] : no varicosities [Pedal Pulses Present] : the pedal pulses are present [No Palpable Aorta] : no palpable aorta [No Extremity Clubbing/Cyanosis] : no extremity clubbing/cyanosis [Soft] : abdomen soft [Non Tender] : non-tender [Non-distended] : non-distended [No Masses] : no abdominal mass palpated [No HSM] : no HSM [Normal Bowel Sounds] : normal bowel sounds [Normal Posterior Cervical Nodes] : no posterior cervical lymphadenopathy [Normal Anterior Cervical Nodes] : no anterior cervical lymphadenopathy [No CVA Tenderness] : no CVA  tenderness [No Spinal Tenderness] : no spinal tenderness [No Joint Swelling] : no joint swelling [Grossly Normal Strength/Tone] : grossly normal strength/tone [No Rash] : no rash [Coordination Grossly Intact] : coordination grossly intact [No Focal Deficits] : no focal deficits [Normal Affect] : the affect was normal [Normal Insight/Judgement] : insight and judgment were intact [de-identified] : mild edema

## 2024-04-01 NOTE — PLAN
[FreeTextEntry1] : suspect benefits anticoagulation outweight risks is on ASA not reliable with meds witout supervision wife not home today extended monitor switch aspirin to Xarelto or Eliquis EPS follow up after bloods reduce cholestyramine to 1/2 packet twice a day and see if this find him a happy medium Message left with son to review and make sure if we do start a blood thinner patient will be monitored properly and stop his aspirin and take the correct dose

## 2024-04-04 ENCOUNTER — NON-APPOINTMENT (OUTPATIENT)
Age: 84
End: 2024-04-04

## 2024-04-04 LAB
ALBUMIN SERPL ELPH-MCNC: 4.2 G/DL
ALP BLD-CCNC: 51 U/L
ALT SERPL-CCNC: 18 U/L
ANION GAP SERPL CALC-SCNC: 12 MMOL/L
AST SERPL-CCNC: 25 U/L
BASOPHILS # BLD AUTO: 0.06 K/UL
BASOPHILS NFR BLD AUTO: 0.6 %
BILIRUB SERPL-MCNC: 0.3 MG/DL
BUN SERPL-MCNC: 26 MG/DL
CALCIUM SERPL-MCNC: 9.2 MG/DL
CHLORIDE SERPL-SCNC: 106 MMOL/L
CK SERPL-CCNC: 58 U/L
CO2 SERPL-SCNC: 22 MMOL/L
CREAT SERPL-MCNC: 1.34 MG/DL
EGFR: 53 ML/MIN/1.73M2
EOSINOPHIL # BLD AUTO: 0.26 K/UL
EOSINOPHIL NFR BLD AUTO: 2.7 %
GLUCOSE SERPL-MCNC: 90 MG/DL
HCT VFR BLD CALC: 33.8 %
HGB BLD-MCNC: 10.6 G/DL
IMM GRANULOCYTES NFR BLD AUTO: 0.5 %
LYMPHOCYTES # BLD AUTO: 1.77 K/UL
LYMPHOCYTES NFR BLD AUTO: 18.5 %
MAN DIFF?: NORMAL
MCHC RBC-ENTMCNC: 29.7 PG
MCHC RBC-ENTMCNC: 31.4 GM/DL
MCV RBC AUTO: 94.7 FL
MONOCYTES # BLD AUTO: 0.65 K/UL
MONOCYTES NFR BLD AUTO: 6.8 %
NEUTROPHILS # BLD AUTO: 6.79 K/UL
NEUTROPHILS NFR BLD AUTO: 70.9 %
NT-PROBNP SERPL-MCNC: 1591 PG/ML
PLATELET # BLD AUTO: 296 K/UL
POTASSIUM SERPL-SCNC: 4.4 MMOL/L
PROT SERPL-MCNC: 6.9 G/DL
RBC # BLD: 3.57 M/UL
RBC # FLD: 13.4 %
SODIUM SERPL-SCNC: 140 MMOL/L
T4 FREE SERPL-MCNC: 1.3 NG/DL
TSH SERPL-ACNC: 1.13 UIU/ML
VIT B12 SERPL-MCNC: 1014 PG/ML
WBC # FLD AUTO: 9.58 K/UL

## 2024-04-08 ENCOUNTER — NON-APPOINTMENT (OUTPATIENT)
Age: 84
End: 2024-04-08

## 2024-04-08 ENCOUNTER — APPOINTMENT (OUTPATIENT)
Dept: INTERNAL MEDICINE | Facility: CLINIC | Age: 84
End: 2024-04-08
Payer: MEDICARE

## 2024-04-08 VITALS
SYSTOLIC BLOOD PRESSURE: 143 MMHG | WEIGHT: 170 LBS | BODY MASS INDEX: 25.76 KG/M2 | HEART RATE: 69 BPM | DIASTOLIC BLOOD PRESSURE: 67 MMHG | OXYGEN SATURATION: 97 % | HEIGHT: 68 IN

## 2024-04-08 VITALS — DIASTOLIC BLOOD PRESSURE: 68 MMHG | SYSTOLIC BLOOD PRESSURE: 122 MMHG

## 2024-04-08 DIAGNOSIS — D64.9 ANEMIA, UNSPECIFIED: ICD-10-CM

## 2024-04-08 PROCEDURE — G2211 COMPLEX E/M VISIT ADD ON: CPT

## 2024-04-08 PROCEDURE — 99214 OFFICE O/P EST MOD 30 MIN: CPT

## 2024-04-08 PROCEDURE — 93000 ELECTROCARDIOGRAM COMPLETE: CPT | Mod: 59

## 2024-04-08 PROCEDURE — 36415 COLL VENOUS BLD VENIPUNCTURE: CPT

## 2024-04-08 NOTE — PLAN
[FreeTextEntry1] : recheck bloods hold off on anticoagulation until better idea of bleeding risk will review with son reviewed with wife Patient is guaiac negative today but blood count dropped on last bloods

## 2024-04-08 NOTE — HISTORY OF PRESENT ILLNESS
[FreeTextEntry1] : Here to follow ongoing conditions Found to be in atrial fibrillation on last visit  [de-identified] : no symptoms, stable functional status when last seen on cholestyramine was constipated, dose reduced to once daily, seems just right now ecg here 4/1/24 atrial fibrillation good ventricular rate, no symptoms now today in NSR wit 1st degree av block Zio mailed in 4/5/24 pending  seen December 18 with nice response to diuretic with improvement of edema Did not have any further cardiology evaluation and is here for follow-up Seen 12/13/23 with son who is a reliable historian and we will do future contact and change of medications through his son Medication list and reasons for each medication reviewed with son in detail Significant change in echocardiogram with decreased LV function and significant increase in right-sided pressures Was found to have edema on exam and furosemide was begun BNP was elevated over 6000 Mild renal insufficiency Abnormal immunoelectrophoresis Very limited in quality of life and activities by lower back pain issues  epidural on December 15, 2023 He is off his Plavix as of last week and held his aspirin as of December 11 has epidural 12/15/23 no issues, maybe a little better  BNP much better creatinine a bit up to recheck today  Change in bowel habits reason for doing colonoscopy, Colonoscopy was postponed by me no chest pain no chest pressure no shortness of breath He is not a reliable historian due to his mild dementia plays golf 18 holes walks the course no issues, not in last few weeks due to cold weather memory issues, cognitive decline, sees neurologist Creatinine also a bit elevated on last test that has some anemia s/p cholecystectomy in 2017 with gangrenous gallbladder s/p coronary artery bypass grafting x 5 in 2002 Thallium stress test 1/2017 showed no significant ischemia but an ejection fraction of 44% echocardiography 1/2019 showed small inferobasal hypokinesis with an estimated ejection fraction in the 55% - 60 % range and moderate aortic insufficiency echo 6/2021 EF 50-55%, inferoseptal and basal inferior hypokinesis, 2 + AI, mild AS echo 8/2022 LVEF 55-60% moderate AI ? mild AS hyperlipidemia on both a statin and fibrate with no muscle side effects past history of prostate cancer and sees his urologist regularly, recent rectal ok, declines rectal today

## 2024-04-08 NOTE — ASSESSMENT
[FreeTextEntry1] : atrial fibrillation no symptoms now in NSR 12/2023 echocardiogram decreased LV systolic function compared to 2022 no symptoms no known events with elevated right sided pressures since 2022 Nice response with much less edema on low-dose furosemide patient also noticed improvement dementia sees neurologist Moreno Viera Mild elevation in creatinine Loose stools and periodic incontinence and diarrhea has resolved completely on cholestyramine and now is taking 1 packet a day dong better

## 2024-04-08 NOTE — PHYSICAL EXAM
[No Acute Distress] : no acute distress [Well Nourished] : well nourished [Well Developed] : well developed [Normal Sclera/Conjunctiva] : normal sclera/conjunctiva [Normal Outer Ear/Nose] : the outer ears and nose were normal in appearance [Normal Oropharynx] : the oropharynx was normal [No JVD] : no jugular venous distention [No Lymphadenopathy] : no lymphadenopathy [Supple] : supple [Thyroid Normal, No Nodules] : the thyroid was normal and there were no nodules present [No Respiratory Distress] : no respiratory distress  [No Accessory Muscle Use] : no accessory muscle use [Clear to Auscultation] : lungs were clear to auscultation bilaterally [Normal Rate] : normal rate  [Normal S1, S2] : normal S1 and S2 [Rhythm Regular] : regular [Distant] : the heart sounds were distant [I] : a grade 1 [No Carotid Bruits] : no carotid bruits [No Abdominal Bruit] : a ~M bruit was not heard ~T in the abdomen [No Varicosities] : no varicosities [Pedal Pulses Present] : the pedal pulses are present [No Palpable Aorta] : no palpable aorta [No Extremity Clubbing/Cyanosis] : no extremity clubbing/cyanosis [Soft] : abdomen soft [Non Tender] : non-tender [Non-distended] : non-distended [No Masses] : no abdominal mass palpated [No HSM] : no HSM [Normal Bowel Sounds] : normal bowel sounds [Normal Posterior Cervical Nodes] : no posterior cervical lymphadenopathy [Normal Anterior Cervical Nodes] : no anterior cervical lymphadenopathy [No CVA Tenderness] : no CVA  tenderness [No Spinal Tenderness] : no spinal tenderness [No Joint Swelling] : no joint swelling [Grossly Normal Strength/Tone] : grossly normal strength/tone [No Rash] : no rash [Coordination Grossly Intact] : coordination grossly intact [No Focal Deficits] : no focal deficits [Stool Occult Blood] : stool negative for occult blood [Speech Grossly Normal] : speech grossly normal [de-identified] : mild edema [FreeTextEntry1] : light brown stool

## 2024-04-09 ENCOUNTER — NON-APPOINTMENT (OUTPATIENT)
Age: 84
End: 2024-04-09

## 2024-04-09 LAB
ALBUMIN SERPL ELPH-MCNC: 3.9 G/DL
ALP BLD-CCNC: 58 U/L
ALT SERPL-CCNC: 16 U/L
ANION GAP SERPL CALC-SCNC: 15 MMOL/L
AST SERPL-CCNC: 24 U/L
BASOPHILS # BLD AUTO: 0.04 K/UL
BASOPHILS NFR BLD AUTO: 0.4 %
BILIRUB SERPL-MCNC: 0.3 MG/DL
BUN SERPL-MCNC: 20 MG/DL
CALCIUM SERPL-MCNC: 8.8 MG/DL
CHLORIDE SERPL-SCNC: 106 MMOL/L
CO2 SERPL-SCNC: 23 MMOL/L
CREAT SERPL-MCNC: 1.39 MG/DL
EGFR: 51 ML/MIN/1.73M2
EOSINOPHIL # BLD AUTO: 0.27 K/UL
EOSINOPHIL NFR BLD AUTO: 2.8 %
FERRITIN SERPL-MCNC: 374 NG/ML
GLUCOSE SERPL-MCNC: 41 MG/DL
HCT VFR BLD CALC: 33.3 %
HGB BLD-MCNC: 10 G/DL
IMM GRANULOCYTES NFR BLD AUTO: 0.5 %
IRON SATN MFR SERPL: 13 %
IRON SERPL-MCNC: 43 UG/DL
LYMPHOCYTES # BLD AUTO: 1.78 K/UL
LYMPHOCYTES NFR BLD AUTO: 18.5 %
MAN DIFF?: NORMAL
MCHC RBC-ENTMCNC: 29.8 PG
MCHC RBC-ENTMCNC: 30 GM/DL
MCV RBC AUTO: 99.1 FL
MONOCYTES # BLD AUTO: 0.68 K/UL
MONOCYTES NFR BLD AUTO: 7.1 %
NEUTROPHILS # BLD AUTO: 6.81 K/UL
NEUTROPHILS NFR BLD AUTO: 70.7 %
NT-PROBNP SERPL-MCNC: 1682 PG/ML
PLATELET # BLD AUTO: 273 K/UL
POTASSIUM SERPL-SCNC: 5.2 MMOL/L
PROT SERPL-MCNC: 6.7 G/DL
RBC # BLD: 3.36 M/UL
RBC # FLD: 13.3 %
SODIUM SERPL-SCNC: 144 MMOL/L
TIBC SERPL-MCNC: 337 UG/DL
UIBC SERPL-MCNC: 294 UG/DL
WBC # FLD AUTO: 9.63 K/UL

## 2024-04-11 ENCOUNTER — APPOINTMENT (OUTPATIENT)
Dept: INTERNAL MEDICINE | Facility: CLINIC | Age: 84
End: 2024-04-11
Payer: MEDICARE

## 2024-04-11 PROCEDURE — 93244 EXT ECG>48HR<7D REV&INTERPJ: CPT

## 2024-04-16 ENCOUNTER — NON-APPOINTMENT (OUTPATIENT)
Age: 84
End: 2024-04-16

## 2024-04-17 ENCOUNTER — APPOINTMENT (OUTPATIENT)
Dept: INTERNAL MEDICINE | Facility: CLINIC | Age: 84
End: 2024-04-17
Payer: MEDICARE

## 2024-04-17 VITALS
HEIGHT: 68 IN | SYSTOLIC BLOOD PRESSURE: 127 MMHG | OXYGEN SATURATION: 96 % | DIASTOLIC BLOOD PRESSURE: 62 MMHG | BODY MASS INDEX: 25.46 KG/M2 | HEART RATE: 71 BPM | WEIGHT: 168 LBS

## 2024-04-17 DIAGNOSIS — K90.89 OTHER INTESTINAL MALABSORPTION: ICD-10-CM

## 2024-04-17 PROCEDURE — 99214 OFFICE O/P EST MOD 30 MIN: CPT

## 2024-04-17 PROCEDURE — G2211 COMPLEX E/M VISIT ADD ON: CPT

## 2024-04-17 PROCEDURE — 36415 COLL VENOUS BLD VENIPUNCTURE: CPT

## 2024-04-17 NOTE — PHYSICAL EXAM
[No Acute Distress] : no acute distress [Well Nourished] : well nourished [Well Developed] : well developed [Normal Sclera/Conjunctiva] : normal sclera/conjunctiva [Normal Outer Ear/Nose] : the outer ears and nose were normal in appearance [Normal Oropharynx] : the oropharynx was normal [No JVD] : no jugular venous distention [No Lymphadenopathy] : no lymphadenopathy [Supple] : supple [Thyroid Normal, No Nodules] : the thyroid was normal and there were no nodules present [No Respiratory Distress] : no respiratory distress  [No Accessory Muscle Use] : no accessory muscle use [Clear to Auscultation] : lungs were clear to auscultation bilaterally [Normal Rate] : normal rate  [Normal S1, S2] : normal S1 and S2 [Rhythm Regular] : regular [Distant] : the heart sounds were distant [I] : a grade 1 [No Carotid Bruits] : no carotid bruits [No Abdominal Bruit] : a ~M bruit was not heard ~T in the abdomen [No Varicosities] : no varicosities [Pedal Pulses Present] : the pedal pulses are present [No Palpable Aorta] : no palpable aorta [No Extremity Clubbing/Cyanosis] : no extremity clubbing/cyanosis [Soft] : abdomen soft [Non Tender] : non-tender [Non-distended] : non-distended [No Masses] : no abdominal mass palpated [No HSM] : no HSM [Normal Bowel Sounds] : normal bowel sounds [Normal Posterior Cervical Nodes] : no posterior cervical lymphadenopathy [Normal Anterior Cervical Nodes] : no anterior cervical lymphadenopathy [No CVA Tenderness] : no CVA  tenderness [No Spinal Tenderness] : no spinal tenderness [No Joint Swelling] : no joint swelling [Grossly Normal Strength/Tone] : grossly normal strength/tone [No Rash] : no rash [Coordination Grossly Intact] : coordination grossly intact [No Focal Deficits] : no focal deficits [Speech Grossly Normal] : speech grossly normal [Stool Occult Blood] : stool negative for occult blood [de-identified] : mild edema [FreeTextEntry1] : light brown stool

## 2024-04-17 NOTE — PLAN
[FreeTextEntry1] : Recheck blood Pharmacologic nuclear stress test If okay then colonoscopy endoscopy

## 2024-04-17 NOTE — ASSESSMENT
[FreeTextEntry1] : Paroxysmal atrial fibrillation no symptoms now in NSR Not a great candidate for anticoagulation due to likely GI bleeding source 12/2023 echocardiogram decreased LV systolic function compared to 2022 no symptoms no known events with elevated right sided pressures since 2022 Nice response with much less edema on low-dose furosemide patient also noticed improvement Active able to walk miles a day with good functional status and no cardiovascular symptoms dementia sees neurologist Moreno Viera Loose stools and periodic incontinence and diarrhea has resolved completely on cholestyramine and now is taking 1 or 2 packets a day dong better Zio Monitor results and options reviewed with son and at this time a pharmacologic nuclear stress test seems the best way to assess his candidacy for endoscopy or colonoscopy if okay he certainly is doing well enough to be cleared for these procedures Would not start on anticoagulation at this time

## 2024-04-17 NOTE — HISTORY OF PRESENT ILLNESS
[FreeTextEntry1] : Here to follow multiple ongoing conditions [de-identified] : no symptoms, stable functional status when  seen on 4/1/24 cholestyramine was constipated, dose reduced to once daily, seems just right now some days does 2 packets, most days one packet ecg here 4/1/24 atrial fibrillation good ventricular rate, no symptoms 4/8/24 in NSR wit 1st degree av block Zio Monitor 3 days 21 hours showed 1 run of ventricular tachycardia lasting 4 beats with a maximum rate of 211, 8 supraventricular tachycardia runs the fastest lasting 12 minutes with a maximum rate of 176 and the longest lasting 12-1/2 minutes with an average rate of 143 Atrial fibrillation burden was 3%  seen December 18 with nice response to diuretic with improvement of edema Did not have any further cardiology evaluation and is here for follow-up Seen 12/13/23 with son who is a reliable historian and we will do future contact and change of medications through his son Medication list and reasons for each medication reviewed with son in detail Significant change in echocardiogram with decreased LV function and significant increase in right-sided pressures Was found to have edema on exam and furosemide was begun BNP was elevated over 6000 Mild renal insufficiency Abnormal immunoelectrophoresis Very limited in quality of life and activities by lower back pain issues  epidural on December 15, 2023 He is off his Plavix as of last week and held his aspirin as of December 11 has epidural 12/15/23 no issues, maybe a little better  BNP much better creatinine a bit up to recheck today  Change in bowel habits reason for doing colonoscopy, Colonoscopy was postponed by me no chest pain no chest pressure no shortness of breath He is not a reliable historian due to his mild dementia plays golf 18 holes walks the course no issues, not in last few weeks due to cold weather memory issues, cognitive decline, sees neurologist Creatinine also a bit elevated on last test that has some anemia s/p cholecystectomy in 2017 with gangrenous gallbladder s/p coronary artery bypass grafting x 5 in 2002 Thallium stress test 1/2017 showed no significant ischemia but an ejection fraction of 44% echocardiography 1/2019 showed small inferobasal hypokinesis with an estimated ejection fraction in the 55% - 60 % range and moderate aortic insufficiency echo 6/2021 EF 50-55%, inferoseptal and basal inferior hypokinesis, 2 + AI, mild AS echo 8/2022 LVEF 55-60% moderate AI ? mild AS hyperlipidemia on both a statin and fibrate with no muscle side effects past history of prostate cancer and sees his urologist regularly, recent rectal ok, declines rectal today

## 2024-05-01 PROBLEM — M51.26 LUMBAR HERNIATED DISC: Status: ACTIVE | Noted: 2023-10-13

## 2024-05-01 PROBLEM — M54.16 RIGHT LUMBAR RADICULOPATHY: Status: ACTIVE | Noted: 2023-12-11

## 2024-05-01 PROBLEM — M48.061 FORAMINAL STENOSIS OF LUMBAR REGION: Status: ACTIVE | Noted: 2023-10-10

## 2024-05-03 ENCOUNTER — APPOINTMENT (OUTPATIENT)
Dept: PAIN MANAGEMENT | Facility: CLINIC | Age: 84
End: 2024-05-03
Payer: MEDICARE

## 2024-05-03 DIAGNOSIS — M51.26 OTHER INTERVERTEBRAL DISC DISPLACEMENT, LUMBAR REGION: ICD-10-CM

## 2024-05-03 DIAGNOSIS — M54.16 RADICULOPATHY, LUMBAR REGION: ICD-10-CM

## 2024-05-03 DIAGNOSIS — M48.061 SPINAL STENOSIS, LUMBAR REGION WITHOUT NEUROGENIC CLAUDICATION: ICD-10-CM

## 2024-05-03 PROCEDURE — 99214 OFFICE O/P EST MOD 30 MIN: CPT

## 2024-05-03 RX ORDER — DICLOFENAC POTASSIUM 50 MG/1
50 TABLET, COATED ORAL 3 TIMES DAILY
Qty: 60 | Refills: 1 | Status: ACTIVE | COMMUNITY
Start: 2024-05-03 | End: 1900-01-01

## 2024-05-03 NOTE — PHYSICAL EXAM
[de-identified] : Gen: NAD Head: NC/AT Eyes: no glasses, no scleral icterus ENT: mucous membranes moist CV: RRR, S1 S2, no mrg Lungs: CTAB, nonlabored breathing Abd: soft, NT/ND Ext: full ROM in all extremities, no peripheral edema Back: limited extension 2/2 pain, +SLR on the right Neuro: CN intact LEs +5 L +5 R hip flexion +5 L +5 R leg extension +5 L +5 R leg flexion +5 L +5 R foot dorsiflexion +5 L +5 R foot plantarflexion +5 L +5 R EHL extension Psych: normal affect Skin: no visible lesions

## 2024-05-03 NOTE — DATA REVIEWED
[MRI] : MRI [Lumbar Spine] : lumbar spine [Report was reviewed and noted in the chart] : The report was reviewed and noted in the chart [I independently reviewed and interpreted images and report] : I independently reviewed and interpreted images and report [FreeTextEntry1] : Mild thoracic spondylosis without spinal canal or foraminal stenosis. Lumbar spondylosis, similar to L4/L5, resulting in right lateral recess narrowing and severe left foraminal narrowing. Degenerative endplate edema at L2/L3 and L4/L5. L1/L2: No spinal canal foraminal narrowing. L2/L3: Disc bulge resulting mild spinal canal narrowing and mild foraminal narrowing. L3/L4: Small disc bulge resulting in indentation of the thecal sac, mild narrowing of the left lateral recess and mild bilateral foraminal narrowing. L4/L5: Disc bulge with superimposed right lateral recess/proximal foraminal extrusion. Bilateral facet arthrosis, right greater than left with thickening of the ligamentum flavum on the right. Findings result in narrowing of the right lateral recess and mild left and severe right foraminal narrowing. L5/S1: No spinal canal foraminal narrowing. Bilateral facet arthrosis, left greater than right. Tiny ligamentum flavum cysts on the right without spinal canal or foraminal narrowing.  Ind. review- NF stenosis most notable L2/3 and severe on R L4/5 with extruded HNP Junctional beats on CPAP appears to have resolved.   -No plans for PPM at this time

## 2024-05-03 NOTE — DISCUSSION/SUMMARY
[de-identified] : AIYANA VARELA is a 83 year-old man presenting for a RPV for a history of chronic low back pain with RLE radicular features.   Prior treatment: 12/15/2023: RIGHT L4-L5, L5-S1 TFESI w/ 50% relief of pain and improvement in function Gabapentin OTC NSAIDs  Plan: 1) MRI lumbar spine images reviewed with the patient. 2) Schedule RIGHT L4-L5, L5-S1 TFESI w/o MAC. The procedure was explained to the patient in detail. Reviewed risks, benefits, and alternatives with the patient. Some risks discussed included temporary increase in pain, bleeding, infection, and side effects from steroids. The patient expressed understanding and would like to proceed. 3) Discussed treatment options, patient has not been engaged in a regular stretching regimen. Had an extensive discussion with the patient regarding the importance of establishing a daily stretching routine. 4) Trial diclofenac 50mg BID prn; Discussed the risks of NSAIDs, including worsening HTN, cardiovascular risks, GI risk, renal injury. The patient was told not to take other NSAIDs with this and to consult with their PCP if there is a significant medical history to warrant this prior to initiating treatment. 5) Continue exercise routine at home 6) RTC post procedure

## 2024-05-03 NOTE — HISTORY OF PRESENT ILLNESS
[Lower back] : lower back [Right Leg] : right leg [9] : 9 [0] : 0 [Dull/Aching] : dull/aching [Radiating] : radiating [Intermittent] : intermittent [Sleep] : sleep [Meds] : meds [Lying in bed] : lying in bed [FreeTextEntry1] : 5/3/2024: AIYANA VARELA is a 83 year-old man presenting for a RPV for a history of chronic low back pain. The patient notes that he continues to have some aching pain in the right low back with radiation to the right gluteal region and posterolateral thigh and posterolateral leg. No focal numbness or weakness in the lower extremities. No bowel or bladder incontinence or saddle anesthesia. Pain limits his ability to walk for more than a few minutes.  The patient states that average pain over the past week was 6/10 in severity. Mood: No depression or anxiety.  Sleep: Patient notes occasional difficulty with sleep maintenance 2/2 pain.   3/1/2024: AIYANA VARELA is a 83 year-old man presenting for a RPV for a history of chronic low back pain with RLE radicular features. Overall, the patient feels that his pain has been stable and manageable since his RIGHT L4-L5, L5-S1 TFESI on 12/15/2023. The patient notes ongoing intermittent pain in the right low back with radiation to the right gluteal region, right posterolateral leg and dorsal foot. He notes intermittent tingling and numbness. No focal weakness. No bowel or bladder incontinence or saddle anesthesia. The patient notes that his pain is worst first thing in the morning and gradually improves throughout the day.  The patient states that average pain over the past week was 5/10 in severity. Mood: Patient denies anxiety or depression.  Sleep: Patient denies having difficulty with sleep initiation or maintenance 2/2 pain.   12/29/2023  AIYANA VARELA is a 83 year-old man presenting for a RPV for a history of chronic low back pain with RLE radicular features. Patient underwent a RIGHT L4-5 L5-S1 TFESI on 12/15/2023. The patient notes having 50% relief of his pain since the procedure and notes improvement of function--particularly with standing and walking. He notes significant improvement of radicular pain in the RLE. He gets occasional tingling and numbness in the right posterior thigh and leg. No focal weakness in the lower extremities. No bowel or bladder incontinence or saddle anesthesia.  The patient states that average pain over the past week was 5/10 in severity. Mood: No anxiety or depression.  Sleep: No difficulty with sleep 2/2 pain.  12/11/2023: AIYANA VARELA is a 83 year-old man presenting for a NPV for a history of chronic low back pain with RLE radicular features. The patient notes having pain over the past year which has gotten significantly worse over the past 2-3 months. No history of trauma or focal injury. At this point, the patient endorses having an aching pain in the right low lumbosacral region with radiation to the right posterolateral thigh and lateral leg to the dorsal foot. No focal numbness or weakness in the lower extremities. No bowel or bladder incontinence or saddle anesthesia. Pain is worse with standing and walking.  The patient states that average pain over the past week was 10/10 in severity. Sleep: No difficulty with sleep initiation or maintenance at this time.  [] : no [FreeTextEntry7] : b/l legs  [de-identified] : waking up in the morning [de-identified] : MRI

## 2024-06-11 LAB
ALBUMIN SERPL ELPH-MCNC: 4 G/DL
ALP BLD-CCNC: 61 U/L
ALT SERPL-CCNC: 15 U/L
ANION GAP SERPL CALC-SCNC: 12 MMOL/L
AST SERPL-CCNC: 18 U/L
BASOPHILS # BLD AUTO: 0.04 K/UL
BASOPHILS NFR BLD AUTO: 0.4 %
BILIRUB SERPL-MCNC: 0.3 MG/DL
BUN SERPL-MCNC: 26 MG/DL
CALCIUM SERPL-MCNC: 9 MG/DL
CHLORIDE SERPL-SCNC: 109 MMOL/L
CO2 SERPL-SCNC: 22 MMOL/L
CREAT SERPL-MCNC: 1.23 MG/DL
EGFR: 59 ML/MIN/1.73M2
EOSINOPHIL # BLD AUTO: 0.27 K/UL
EOSINOPHIL NFR BLD AUTO: 3 %
FERRITIN SERPL-MCNC: 345 NG/ML
GLUCOSE SERPL-MCNC: 91 MG/DL
HCT VFR BLD CALC: 33.7 %
HGB BLD-MCNC: 10 G/DL
IMM GRANULOCYTES NFR BLD AUTO: 0.7 %
IRON SATN MFR SERPL: 8 %
IRON SERPL-MCNC: 29 UG/DL
LYMPHOCYTES # BLD AUTO: 1.84 K/UL
LYMPHOCYTES NFR BLD AUTO: 20.6 %
MAN DIFF?: NORMAL
MCHC RBC-ENTMCNC: 28.8 PG
MCHC RBC-ENTMCNC: 29.7 GM/DL
MCV RBC AUTO: 97.1 FL
MONOCYTES # BLD AUTO: 0.65 K/UL
MONOCYTES NFR BLD AUTO: 7.3 %
NEUTROPHILS # BLD AUTO: 6.08 K/UL
NEUTROPHILS NFR BLD AUTO: 68 %
NT-PROBNP SERPL-MCNC: 2219 PG/ML
PLATELET # BLD AUTO: 251 K/UL
POTASSIUM SERPL-SCNC: 4.9 MMOL/L
PROT SERPL-MCNC: 6.5 G/DL
RBC # BLD: 3.47 M/UL
RBC # FLD: 13.3 %
SODIUM SERPL-SCNC: 143 MMOL/L
TIBC SERPL-MCNC: 373 UG/DL
UIBC SERPL-MCNC: 343 UG/DL
WBC # FLD AUTO: 8.94 K/UL

## 2024-06-13 ENCOUNTER — NON-APPOINTMENT (OUTPATIENT)
Age: 84
End: 2024-06-13

## 2024-06-13 ENCOUNTER — APPOINTMENT (OUTPATIENT)
Dept: INTERNAL MEDICINE | Facility: CLINIC | Age: 84
End: 2024-06-13
Payer: MEDICARE

## 2024-06-13 VITALS
SYSTOLIC BLOOD PRESSURE: 133 MMHG | WEIGHT: 175 LBS | BODY MASS INDEX: 26.52 KG/M2 | DIASTOLIC BLOOD PRESSURE: 72 MMHG | HEIGHT: 68 IN | HEART RATE: 66 BPM | OXYGEN SATURATION: 96 %

## 2024-06-13 VITALS — SYSTOLIC BLOOD PRESSURE: 120 MMHG | DIASTOLIC BLOOD PRESSURE: 74 MMHG

## 2024-06-13 DIAGNOSIS — E78.5 HYPERLIPIDEMIA, UNSPECIFIED: ICD-10-CM

## 2024-06-13 DIAGNOSIS — D51.0 VITAMIN B12 DEFICIENCY ANEMIA DUE TO INTRINSIC FACTOR DEFICIENCY: ICD-10-CM

## 2024-06-13 DIAGNOSIS — R06.09 OTHER FORMS OF DYSPNEA: ICD-10-CM

## 2024-06-13 DIAGNOSIS — I77.89 OTHER SPECIFIED DISORDERS OF ARTERIES AND ARTERIOLES: ICD-10-CM

## 2024-06-13 PROCEDURE — 36415 COLL VENOUS BLD VENIPUNCTURE: CPT

## 2024-06-13 PROCEDURE — G2211 COMPLEX E/M VISIT ADD ON: CPT

## 2024-06-13 PROCEDURE — 99214 OFFICE O/P EST MOD 30 MIN: CPT

## 2024-06-13 PROCEDURE — 93000 ELECTROCARDIOGRAM COMPLETE: CPT

## 2024-06-13 NOTE — HISTORY OF PRESENT ILLNESS
[FreeTextEntry1] : here to follow ongoing conditions [de-identified] : no symptoms, stable functional status notes swelling of legs Despite his cognitive decline there is no one except him managing his own medicines unsure if compliant in fact he likely misses them frequently when  seen on 4/1/24 cholestyramine was constipated, dose reduced to once daily, seems just right now some days does 2 packets, most days one packet ecg here 4/1/24 atrial fibrillation good ventricular rate, no symptoms 4/8/24 in NSR wit 1st degree av block Zio Monitor 3 days 21 hours showed 1 run of ventricular tachycardia lasting 4 beats with a maximum rate of 211, 8 supraventricular tachycardia runs the fastest lasting 12 minutes with a maximum rate of 176 and the longest lasting 12-1/2 minutes with an average rate of 143 Atrial fibrillation burden was 3%  seen December 18 with nice response to diuretic with improvement of edema Did not have any further cardiology evaluation and is here for follow-up Seen 12/13/23 with son who is a reliable historian and we will do future contact and change of medications through his son Medication list and reasons for each medication reviewed with son in detail Significant change in echocardiogram with decreased LV function and significant increase in right-sided pressures Was found to have edema on exam and furosemide was begun BNP was elevated over 6000 Mild renal insufficiency Abnormal immunoelectrophoresis Very limited in quality of life and activities by lower back pain issues  epidural on December 15, 2023 He is off his Plavix as of last week and held his aspirin as of December 11 has epidural 12/15/23 no issues, maybe a little better  BNP much better creatinine a bit up to recheck today  Change in bowel habits reason for doing colonoscopy, Colonoscopy was postponed by me no chest pain no chest pressure no shortness of breath He is not a reliable historian due to his mild dementia plays golf 18 holes walks the course no issues, not in last few weeks due to cold weather memory issues, cognitive decline, sees neurologist Creatinine also a bit elevated on last test that has some anemia s/p cholecystectomy in 2017 with gangrenous gallbladder s/p coronary artery bypass grafting x 5 in 2002 Thallium stress test 1/2017 showed no significant ischemia but an ejection fraction of 44% echocardiography 1/2019 showed small inferobasal hypokinesis with an estimated ejection fraction in the 55% - 60 % range and moderate aortic insufficiency echo 6/2021 EF 50-55%, inferoseptal and basal inferior hypokinesis, 2 + AI, mild AS echo 8/2022 LVEF 55-60% moderate AI ? mild AS hyperlipidemia on both a statin and fibrate with no muscle side effects past history of prostate cancer and sees his urologist regularly, recent rectal ok, declines rectal today

## 2024-06-13 NOTE — PLAN
[FreeTextEntry1] : Check bloods Patient will went home confirm whether or not he has been taking the furosemide 20 mg this was written out in detail for the patient If he has been taking it we will either increase the dose or switch to torsemide If he has not been taking we will start taking it Further management pending results of the blood work

## 2024-06-13 NOTE — PHYSICAL EXAM
[No Acute Distress] : no acute distress [Well Nourished] : well nourished [Well Developed] : well developed [Normal Sclera/Conjunctiva] : normal sclera/conjunctiva [Normal Outer Ear/Nose] : the outer ears and nose were normal in appearance [Normal Oropharynx] : the oropharynx was normal [No JVD] : no jugular venous distention [No Lymphadenopathy] : no lymphadenopathy [Supple] : supple [Thyroid Normal, No Nodules] : the thyroid was normal and there were no nodules present [No Respiratory Distress] : no respiratory distress  [No Accessory Muscle Use] : no accessory muscle use [Clear to Auscultation] : lungs were clear to auscultation bilaterally [Normal Rate] : normal rate  [Normal S1, S2] : normal S1 and S2 [Premature Beats] : regular with premature beats [Distant] : the heart sounds were distant [I] : a grade 1 [No Carotid Bruits] : no carotid bruits [No Abdominal Bruit] : a ~M bruit was not heard ~T in the abdomen [No Varicosities] : no varicosities [Pedal Pulses Present] : the pedal pulses are present [No Palpable Aorta] : no palpable aorta [No Extremity Clubbing/Cyanosis] : no extremity clubbing/cyanosis [Soft] : abdomen soft [Non Tender] : non-tender [Non-distended] : non-distended [No HSM] : no HSM [No Masses] : no abdominal mass palpated [Normal Bowel Sounds] : normal bowel sounds [Normal Posterior Cervical Nodes] : no posterior cervical lymphadenopathy [Normal Anterior Cervical Nodes] : no anterior cervical lymphadenopathy [No CVA Tenderness] : no CVA  tenderness [No Spinal Tenderness] : no spinal tenderness [No Joint Swelling] : no joint swelling [Grossly Normal Strength/Tone] : grossly normal strength/tone [No Rash] : no rash [Coordination Grossly Intact] : coordination grossly intact [No Focal Deficits] : no focal deficits [Speech Grossly Normal] : speech grossly normal [de-identified] : mild edema

## 2024-06-13 NOTE — ASSESSMENT
[FreeTextEntry1] : Paroxysmal atrial fibrillation no symptoms now in NSR Not a good candidate for anticoagulation due to likely GI bleeding source 12/2023 echocardiogram decreased LV systolic function compared to 2022 no symptoms no known events with elevated right sided pressures since 2022 Active able to walk miles a day with good functional status and no cardiovascular symptoms dementia sees neurologist Moreno Martyjerrell Never went for pharmacologic stress test At this time is uncertain he wishes to Mild edema of the legs more so than on last visit Uncertain compliance with medication

## 2024-06-14 ENCOUNTER — NON-APPOINTMENT (OUTPATIENT)
Age: 84
End: 2024-06-14

## 2024-06-14 LAB
ALBUMIN SERPL ELPH-MCNC: 4.3 G/DL
ALP BLD-CCNC: 72 U/L
ALT SERPL-CCNC: 19 U/L
ANION GAP SERPL CALC-SCNC: 12 MMOL/L
AST SERPL-CCNC: 30 U/L
BASOPHILS # BLD AUTO: 0.05 K/UL
BASOPHILS NFR BLD AUTO: 0.6 %
BILIRUB SERPL-MCNC: 0.3 MG/DL
BUN SERPL-MCNC: 26 MG/DL
CALCIUM SERPL-MCNC: 9.3 MG/DL
CHLORIDE SERPL-SCNC: 111 MMOL/L
CK SERPL-CCNC: 49 U/L
CO2 SERPL-SCNC: 22 MMOL/L
CREAT SERPL-MCNC: 1.51 MG/DL
EGFR: 46 ML/MIN/1.73M2
EOSINOPHIL # BLD AUTO: 0.28 K/UL
EOSINOPHIL NFR BLD AUTO: 3.6 %
GLUCOSE SERPL-MCNC: 90 MG/DL
HCT VFR BLD CALC: 33.6 %
HGB BLD-MCNC: 10 G/DL
IMM GRANULOCYTES NFR BLD AUTO: 0.5 %
LYMPHOCYTES # BLD AUTO: 1.45 K/UL
LYMPHOCYTES NFR BLD AUTO: 18.8 %
MAN DIFF?: NORMAL
MCHC RBC-ENTMCNC: 29.1 PG
MCHC RBC-ENTMCNC: 29.8 GM/DL
MCV RBC AUTO: 97.7 FL
MONOCYTES # BLD AUTO: 0.55 K/UL
MONOCYTES NFR BLD AUTO: 7.1 %
NEUTROPHILS # BLD AUTO: 5.36 K/UL
NEUTROPHILS NFR BLD AUTO: 69.4 %
NT-PROBNP SERPL-MCNC: 9149 PG/ML
PLATELET # BLD AUTO: 217 K/UL
POTASSIUM SERPL-SCNC: 5.8 MMOL/L
PROT SERPL-MCNC: 6.9 G/DL
RBC # BLD: 3.44 M/UL
RBC # FLD: 14.6 %
SODIUM SERPL-SCNC: 145 MMOL/L
VIT B12 SERPL-MCNC: 1356 PG/ML
WBC # FLD AUTO: 7.73 K/UL

## 2024-06-18 ENCOUNTER — APPOINTMENT (OUTPATIENT)
Dept: INTERNAL MEDICINE | Facility: CLINIC | Age: 84
End: 2024-06-18
Payer: MEDICARE

## 2024-06-18 VITALS
DIASTOLIC BLOOD PRESSURE: 79 MMHG | BODY MASS INDEX: 26.52 KG/M2 | HEART RATE: 69 BPM | HEIGHT: 68 IN | WEIGHT: 175 LBS | SYSTOLIC BLOOD PRESSURE: 135 MMHG | OXYGEN SATURATION: 97 %

## 2024-06-18 VITALS — WEIGHT: 174 LBS | BODY MASS INDEX: 26.46 KG/M2

## 2024-06-18 VITALS — SYSTOLIC BLOOD PRESSURE: 120 MMHG | DIASTOLIC BLOOD PRESSURE: 76 MMHG

## 2024-06-18 DIAGNOSIS — R79.89 OTHER SPECIFIED ABNORMAL FINDINGS OF BLOOD CHEMISTRY: ICD-10-CM

## 2024-06-18 DIAGNOSIS — I47.29 OTHER VENTRICULAR TACHYCARDIA: ICD-10-CM

## 2024-06-18 DIAGNOSIS — I27.20 PULMONARY HYPERTENSION, UNSPECIFIED: ICD-10-CM

## 2024-06-18 DIAGNOSIS — I48.0 PAROXYSMAL ATRIAL FIBRILLATION: ICD-10-CM

## 2024-06-18 DIAGNOSIS — Z95.1 PRESENCE OF AORTOCORONARY BYPASS GRAFT: ICD-10-CM

## 2024-06-18 DIAGNOSIS — D50.0 IRON DEFICIENCY ANEMIA SECONDARY TO BLOOD LOSS (CHRONIC): ICD-10-CM

## 2024-06-18 DIAGNOSIS — R60.0 LOCALIZED EDEMA: ICD-10-CM

## 2024-06-18 DIAGNOSIS — I50.20 UNSPECIFIED SYSTOLIC (CONGESTIVE) HEART FAILURE: ICD-10-CM

## 2024-06-18 LAB
ANION GAP SERPL CALC-SCNC: 12 MMOL/L
BASOPHILS # BLD AUTO: 0.04 K/UL
BASOPHILS NFR BLD AUTO: 0.5 %
BUN SERPL-MCNC: 32 MG/DL
CALCIUM SERPL-MCNC: 9.2 MG/DL
CHLORIDE SERPL-SCNC: 111 MMOL/L
CO2 SERPL-SCNC: 24 MMOL/L
CREAT SERPL-MCNC: 1.47 MG/DL
EGFR: 48 ML/MIN/1.73M2
EOSINOPHIL # BLD AUTO: 0.36 K/UL
EOSINOPHIL NFR BLD AUTO: 4.9 %
FERRITIN SERPL-MCNC: 266 NG/ML
GLUCOSE SERPL-MCNC: 111 MG/DL
HCT VFR BLD CALC: 33.3 %
HGB BLD-MCNC: 10.1 G/DL
IMM GRANULOCYTES NFR BLD AUTO: 0.7 %
IRON SATN MFR SERPL: 14 %
IRON SERPL-MCNC: 54 UG/DL
LYMPHOCYTES # BLD AUTO: 1.38 K/UL
LYMPHOCYTES NFR BLD AUTO: 18.6 %
MAN DIFF?: NORMAL
MCHC RBC-ENTMCNC: 29.1 PG
MCHC RBC-ENTMCNC: 30.3 GM/DL
MCV RBC AUTO: 96 FL
MONOCYTES # BLD AUTO: 0.52 K/UL
MONOCYTES NFR BLD AUTO: 7 %
NEUTROPHILS # BLD AUTO: 5.06 K/UL
NEUTROPHILS NFR BLD AUTO: 68.3 %
NT-PROBNP SERPL-MCNC: 4321 PG/ML
PLATELET # BLD AUTO: 214 K/UL
POTASSIUM SERPL-SCNC: 5 MMOL/L
RBC # BLD: 3.47 M/UL
RBC # FLD: 14.3 %
SODIUM SERPL-SCNC: 148 MMOL/L
TIBC SERPL-MCNC: 384 UG/DL
UIBC SERPL-MCNC: 330 UG/DL
WBC # FLD AUTO: 7.41 K/UL

## 2024-06-18 PROCEDURE — 99214 OFFICE O/P EST MOD 30 MIN: CPT

## 2024-06-18 PROCEDURE — G2211 COMPLEX E/M VISIT ADD ON: CPT

## 2024-06-18 PROCEDURE — 36415 COLL VENOUS BLD VENIPUNCTURE: CPT

## 2024-06-18 NOTE — HISTORY OF PRESENT ILLNESS
[FreeTextEntry1] : Here to follow multiple ongoing conditions [de-identified] : no symptoms, stable functional status Seen June 13, 2024 complaining of swelling of legs Despite his cognitive decline there is no one except him managing his own medicines unsure if compliant in fact he likely misses them frequently Over phone patient seem to confirm and going through his box of medicines that he did have the furosemide 20 mg and he was taking it once daily  The furosemide was increased to 40 mg 2 daily and patient is here for follow-up Blood showed a significant rise in his BNP as well as An elevation in potassium and creatinine when  seen on 4/1/24 cholestyramine was constipated, dose reduced to once daily, seems just right now some days does 2 packets, most days one packet ecg here 4/1/24 atrial fibrillation good ventricular rate, no symptoms 4/8/24 in NSR wit 1st degree av block Zio Monitor 3 days 21 hours showed 1 run of ventricular tachycardia lasting 4 beats with a maximum rate of 211, 8 supraventricular tachycardia runs the fastest lasting 12 minutes with a maximum rate of 176 and the longest lasting 12-1/2 minutes with an average rate of 143 Atrial fibrillation burden was 3%  seen December 18 with nice response to diuretic with improvement of edema Did not have any further cardiology evaluation and is here for follow-up Seen 12/13/23 with son who is a reliable historian and we will do future contact and change of medications through his son Medication list and reasons for each medication reviewed with son in detail Significant change in echocardiogram with decreased LV function and significant increase in right-sided pressures Was found to have edema on exam and furosemide was begun BNP was elevated over 6000 Mild renal insufficiency Abnormal immunoelectrophoresis Very limited in quality of life and activities by lower back pain issues  epidural on December 15, 2023 He is off his Plavix as of last week and held his aspirin as of December 11 has epidural 12/15/23 no issues, maybe a little better  BNP much better creatinine a bit up to recheck today  Change in bowel habits reason for doing colonoscopy, Colonoscopy was postponed by me no chest pain no chest pressure no shortness of breath He is not a reliable historian due to his mild dementia plays golf 18 holes walks the course no issues, not in last few weeks due to cold weather memory issues, cognitive decline, sees neurologist Creatinine also a bit elevated on last test that has some anemia s/p cholecystectomy in 2017 with gangrenous gallbladder s/p coronary artery bypass grafting x 5 in 2002 Thallium stress test 1/2017 showed no significant ischemia but an ejection fraction of 44% echocardiography 1/2019 showed small inferobasal hypokinesis with an estimated ejection fraction in the 55% - 60 % range and moderate aortic insufficiency echo 6/2021 EF 50-55%, inferoseptal and basal inferior hypokinesis, 2 + AI, mild AS echo 8/2022 LVEF 55-60% moderate AI ? mild AS hyperlipidemia on both a statin and fibrate with no muscle side effects past history of prostate cancer and sees his urologist regularly, recent rectal ok, declines rectal today

## 2024-06-18 NOTE — PHYSICAL EXAM
[No Acute Distress] : no acute distress [Well Nourished] : well nourished [Well Developed] : well developed [Normal Sclera/Conjunctiva] : normal sclera/conjunctiva [Normal Outer Ear/Nose] : the outer ears and nose were normal in appearance [Normal Oropharynx] : the oropharynx was normal [No JVD] : no jugular venous distention [No Lymphadenopathy] : no lymphadenopathy [Supple] : supple [Thyroid Normal, No Nodules] : the thyroid was normal and there were no nodules present [No Respiratory Distress] : no respiratory distress  [No Accessory Muscle Use] : no accessory muscle use [Clear to Auscultation] : lungs were clear to auscultation bilaterally [Normal Rate] : normal rate  [Normal S1, S2] : normal S1 and S2 [Rhythm Regular] : regular [Distant] : the heart sounds were distant [I] : a grade 1 [No Carotid Bruits] : no carotid bruits [No Abdominal Bruit] : a ~M bruit was not heard ~T in the abdomen [No Varicosities] : no varicosities [Pedal Pulses Present] : the pedal pulses are present [No Palpable Aorta] : no palpable aorta [No Extremity Clubbing/Cyanosis] : no extremity clubbing/cyanosis [Soft] : abdomen soft [Non Tender] : non-tender [Non-distended] : non-distended [No Masses] : no abdominal mass palpated [No HSM] : no HSM [Normal Bowel Sounds] : normal bowel sounds [Normal Posterior Cervical Nodes] : no posterior cervical lymphadenopathy [Normal Anterior Cervical Nodes] : no anterior cervical lymphadenopathy [No CVA Tenderness] : no CVA  tenderness [No Spinal Tenderness] : no spinal tenderness [No Joint Swelling] : no joint swelling [Grossly Normal Strength/Tone] : grossly normal strength/tone [No Rash] : no rash [Coordination Grossly Intact] : coordination grossly intact [No Focal Deficits] : no focal deficits [Speech Grossly Normal] : speech grossly normal [de-identified] : less edema

## 2024-06-18 NOTE — PLAN
[FreeTextEntry1] : check bloods adjust meds accordingly reconsider more aggressive management will discuss with son reconsider d/c asa and starting anticiagulation

## 2024-06-18 NOTE — ASSESSMENT
[FreeTextEntry1] : Paroxysmal atrial fibrillation no symptoms in NSR on exam Not a good candidate for anticoagulation due to likely GI bleeding source 12/2023 echocardiogram decreased LV systolic function compared to 2022 no symptoms no known events with elevated right sided pressures since 2022 Active able to walk miles a day with good functional status and no cardiovascular symptoms dementia sees neurologist Moreno Viera did not go for pharmacologic stress test At this time is uncertain he wishes to Mild edema of the legs better than last week

## 2024-07-15 ENCOUNTER — RX RENEWAL (OUTPATIENT)
Age: 84
End: 2024-07-15

## 2024-07-16 ENCOUNTER — NON-APPOINTMENT (OUTPATIENT)
Age: 84
End: 2024-07-16

## 2024-07-18 ENCOUNTER — APPOINTMENT (OUTPATIENT)
Dept: PAIN MANAGEMENT | Facility: CLINIC | Age: 84
End: 2024-07-18
Payer: MEDICARE

## 2024-07-18 DIAGNOSIS — M54.16 RADICULOPATHY, LUMBAR REGION: ICD-10-CM

## 2024-07-18 DIAGNOSIS — M51.26 OTHER INTERVERTEBRAL DISC DISPLACEMENT, LUMBAR REGION: ICD-10-CM

## 2024-07-18 PROCEDURE — 64483 NJX AA&/STRD TFRM EPI L/S 1: CPT

## 2024-07-18 PROCEDURE — 64484 NJX AA&/STRD TFRM EPI L/S EA: CPT

## 2024-08-02 ENCOUNTER — APPOINTMENT (OUTPATIENT)
Dept: PAIN MANAGEMENT | Facility: CLINIC | Age: 84
End: 2024-08-02
Payer: MEDICARE

## 2024-08-02 VITALS — HEIGHT: 68 IN | BODY MASS INDEX: 25.16 KG/M2 | WEIGHT: 166 LBS

## 2024-08-02 DIAGNOSIS — M54.16 RADICULOPATHY, LUMBAR REGION: ICD-10-CM

## 2024-08-02 DIAGNOSIS — M51.26 OTHER INTERVERTEBRAL DISC DISPLACEMENT, LUMBAR REGION: ICD-10-CM

## 2024-08-02 PROCEDURE — 99214 OFFICE O/P EST MOD 30 MIN: CPT

## 2024-08-02 NOTE — DISCUSSION/SUMMARY
[de-identified] : AIYANA VARELA is a 83 year-old man presenting for a RPV for a history of chronic low back pain with RLE radicular features.   Prior treatment: 7/18/2024 RIGHT L4-L5, L5-S1 TFESI w/o MAC w/ 50% improvement of pain and function  12/15/2023: RIGHT L4-L5, L5-S1 TFESI w/ 50% relief of pain and improvement in function for 5 months Gabapentin OTC NSAIDs  Plan: 1) MRI lumbar spine images reviewed with the patient. 2) Schedule L5-S1 ILESI w/o MAC. The procedure was explained to the patient in detail. Reviewed risks, benefits, and alternatives with the patient. Some risks discussed included temporary increase in pain, bleeding, infection, and side effects from steroids. The patient expressed understanding and would like to proceed. 3) Discussed treatment options, patient has not been engaged in a regular stretching regimen. Had an extensive discussion with the patient regarding the importance of establishing a daily stretching routine. 4) Continue diclofenac 50mg BID prn; denies AEs 5) Continue exercise routine at home 6) RTC post procedure

## 2024-08-02 NOTE — HISTORY OF PRESENT ILLNESS
[Lower back] : lower back [Right Leg] : right leg [Dull/Aching] : dull/aching [Radiating] : radiating [Intermittent] : intermittent [Sleep] : sleep [Meds] : meds [Lying in bed] : lying in bed [7] : 7 [FreeTextEntry1] : 8/2/2024 AIYANA VARELA is a 83 year-old man presenting for a RPV for a history of chronic low back pain. The patient underwent a RIGHT L4-L5, L5-S1 TFESI w/o MAC on 7/18/2024. The patient notes 50% improvement of pain and function since the procedure. The patient notes continuing to have an aching pain in the right low back with radiation to the right posterior thigh and posterolateral leg. No focal numbness or weakness in the lower extremities. No bowel or bladder incontinence or saddle anesthesia. The patient states that average pain over the past week was 4/10 in severity. Mood: No depression or anxiety.  Sleep: Patient notes occasional difficulty with sleep maintenance 2/2 pain.   5/3/2024: AIYANA VARELA is a 83 year-old man presenting for a RPV for a history of chronic low back pain. The patient notes that he continues to have some aching pain in the right low back with radiation to the right gluteal region and posterolateral thigh and posterolateral leg. No focal numbness or weakness in the lower extremities. No bowel or bladder incontinence or saddle anesthesia. Pain limits his ability to walk for more than a few minutes.  The patient states that average pain over the past week was 6/10 in severity. Mood: No depression or anxiety.  Sleep: Patient notes occasional difficulty with sleep maintenance 2/2 pain.   3/1/2024: AIYANA VARELA is a 83 year-old man presenting for a RPV for a history of chronic low back pain with RLE radicular features. Overall, the patient feels that his pain has been stable and manageable since his RIGHT L4-L5, L5-S1 TFESI on 12/15/2023. The patient notes ongoing intermittent pain in the right low back with radiation to the right gluteal region, right posterolateral leg and dorsal foot. He notes intermittent tingling and numbness. No focal weakness. No bowel or bladder incontinence or saddle anesthesia. The patient notes that his pain is worst first thing in the morning and gradually improves throughout the day.  The patient states that average pain over the past week was 5/10 in severity. Mood: Patient denies anxiety or depression.  Sleep: Patient denies having difficulty with sleep initiation or maintenance 2/2 pain.   12/29/2023  AIYANA VARELA is a 83 year-old man presenting for a RPV for a history of chronic low back pain with RLE radicular features. Patient underwent a RIGHT L4-5 L5-S1 TFESI on 12/15/2023. The patient notes having 50% relief of his pain since the procedure and notes improvement of function--particularly with standing and walking. He notes significant improvement of radicular pain in the RLE. He gets occasional tingling and numbness in the right posterior thigh and leg. No focal weakness in the lower extremities. No bowel or bladder incontinence or saddle anesthesia.  The patient states that average pain over the past week was 5/10 in severity. Mood: No anxiety or depression.  Sleep: No difficulty with sleep 2/2 pain.  12/11/2023: AIYANA VARELA is a 83 year-old man presenting for a NPV for a history of chronic low back pain with RLE radicular features. The patient notes having pain over the past year which has gotten significantly worse over the past 2-3 months. No history of trauma or focal injury. At this point, the patient endorses having an aching pain in the right low lumbosacral region with radiation to the right posterolateral thigh and lateral leg to the dorsal foot. No focal numbness or weakness in the lower extremities. No bowel or bladder incontinence or saddle anesthesia. Pain is worse with standing and walking.  The patient states that average pain over the past week was 10/10 in severity. Sleep: No difficulty with sleep initiation or maintenance at this time.  [] : no [FreeTextEntry7] : b/l legs  [de-identified] : waking up in the morning [de-identified] : MRI

## 2024-08-02 NOTE — PHYSICAL EXAM
[de-identified] : Gen: NAD Head: NC/AT Eyes: no glasses, no scleral icterus ENT: mucous membranes moist CV: RRR, S1 S2, no mrg Lungs: CTAB, nonlabored breathing Abd: soft, NT/ND Ext: full ROM in all extremities, no peripheral edema Back: limited extension 2/2 pain, +SLR on the right Neuro: CN intact LEs +5 L +5 R hip flexion +5 L +5 R leg extension +5 L +5 R leg flexion +5 L +5 R foot dorsiflexion +5 L +5 R foot plantarflexion +5 L +5 R EHL extension Psych: normal affect Skin: no visible lesions

## 2024-08-16 ENCOUNTER — APPOINTMENT (OUTPATIENT)
Dept: PAIN MANAGEMENT | Facility: CLINIC | Age: 84
End: 2024-08-16

## 2024-08-28 ENCOUNTER — APPOINTMENT (OUTPATIENT)
Dept: INTERNAL MEDICINE | Facility: CLINIC | Age: 84
End: 2024-08-28

## 2024-08-30 ENCOUNTER — APPOINTMENT (OUTPATIENT)
Dept: PAIN MANAGEMENT | Facility: CLINIC | Age: 84
End: 2024-08-30
Payer: MEDICARE

## 2024-08-30 VITALS — HEIGHT: 68 IN | BODY MASS INDEX: 27.13 KG/M2 | WEIGHT: 179 LBS

## 2024-08-30 DIAGNOSIS — M54.16 RADICULOPATHY, LUMBAR REGION: ICD-10-CM

## 2024-08-30 DIAGNOSIS — M51.26 OTHER INTERVERTEBRAL DISC DISPLACEMENT, LUMBAR REGION: ICD-10-CM

## 2024-08-30 PROCEDURE — 99214 OFFICE O/P EST MOD 30 MIN: CPT

## 2024-08-30 NOTE — DISCUSSION/SUMMARY
[de-identified] : AIYANA VARELA is a 83 year-old man presenting for a RPV for a history of chronic low back pain with RLE radicular features.   Prior treatment: 7/18/2024 RIGHT L4-L5, L5-S1 TFESI w/o MAC w/ 50% improvement of pain and function  12/15/2023: RIGHT L4-L5, L5-S1 TFESI w/ 50% relief of pain and improvement in function for 5 months Gabapentin OTC NSAIDs  Plan: 1) MRI lumbar spine images reviewed with the patient. 2) Schedule L5-S1 ILESI w/o MAC. The procedure was explained to the patient in detail. Reviewed risks, benefits, and alternatives with the patient. Some risks discussed included temporary increase in pain, bleeding, infection, and side effects from steroids. The patient expressed understanding and would like to proceed. 3) Discussed treatment options, patient has not been engaged in a regular stretching regimen. Had an extensive discussion with the patient regarding the importance of establishing a daily stretching routine. 4) Continue diclofenac 50mg BID prn; denies AEs 5) Continue exercise routine at home 6) RTC post procedure

## 2024-08-30 NOTE — DISCUSSION/SUMMARY
[de-identified] : AIYANA VARELA is a 83 year-old man presenting for a RPV for a history of chronic low back pain with RLE radicular features.   Prior treatment: 7/18/2024 RIGHT L4-L5, L5-S1 TFESI w/o MAC w/ 50% improvement of pain and function  12/15/2023: RIGHT L4-L5, L5-S1 TFESI w/ 50% relief of pain and improvement in function for 5 months Gabapentin OTC NSAIDs  Plan: 1) MRI lumbar spine images reviewed with the patient. 2) Schedule L5-S1 ILESI w/o MAC. The procedure was explained to the patient in detail. Reviewed risks, benefits, and alternatives with the patient. Some risks discussed included temporary increase in pain, bleeding, infection, and side effects from steroids. The patient expressed understanding and would like to proceed. 3) Discussed treatment options, patient has not been engaged in a regular stretching regimen. Had an extensive discussion with the patient regarding the importance of establishing a daily stretching routine. 4) Continue diclofenac 50mg BID prn; denies AEs 5) Continue exercise routine at home 6) RTC post procedure

## 2024-08-30 NOTE — HISTORY OF PRESENT ILLNESS
[Lower back] : lower back [Right Leg] : right leg [7] : 7 [Dull/Aching] : dull/aching [Radiating] : radiating [Intermittent] : intermittent [Sleep] : sleep [Meds] : meds [Lying in bed] : lying in bed [0] : 0 [FreeTextEntry1] : 8/30/2024 AIYANA VARELA is a 83 year-old man presenting for a RPV for a history of chronic low back pain. The patient notes that he continues to have difficulty with ambulation 2/2 pain. The patient notes that he continues to have pain in the low back with radiation to the right posterior thigh and anterolateral leg. No focal numbness or weakness in the lower extremities. No bowel or bladder incontinence or saddle anesthesia. The patient continues to take acetaminophen and diclofenac prn. He notes that these do help with his pain.  The patient states that average pain over the past week was 5/10 in severity. Mood: No depression or anxiety.  Sleep: Patient notes occasional difficulty with sleep maintenance 2/2 pain.   8/2/2024 AIYANA VARELA is a 83 year-old man presenting for a RPV for a history of chronic low back pain. The patient underwent a RIGHT L4-L5, L5-S1 TFESI w/o MAC on 7/18/2024. The patient notes 50% improvement of pain and function since the procedure. The patient notes continuing to have an aching pain in the right low back with radiation to the right posterior thigh and posterolateral leg. No focal numbness or weakness in the lower extremities. No bowel or bladder incontinence or saddle anesthesia. The patient states that average pain over the past week was 4/10 in severity. Mood: No depression or anxiety.  Sleep: Patient notes occasional difficulty with sleep maintenance 2/2 pain.   5/3/2024: AIYANA VARELA is a 83 year-old man presenting for a RPV for a history of chronic low back pain. The patient notes that he continues to have some aching pain in the right low back with radiation to the right gluteal region and posterolateral thigh and posterolateral leg. No focal numbness or weakness in the lower extremities. No bowel or bladder incontinence or saddle anesthesia. Pain limits his ability to walk for more than a few minutes.  The patient states that average pain over the past week was 6/10 in severity. Mood: No depression or anxiety.  Sleep: Patient notes occasional difficulty with sleep maintenance 2/2 pain.   3/1/2024: AIYANA VARELA is a 83 year-old man presenting for a RPV for a history of chronic low back pain with RLE radicular features. Overall, the patient feels that his pain has been stable and manageable since his RIGHT L4-L5, L5-S1 TFESI on 12/15/2023. The patient notes ongoing intermittent pain in the right low back with radiation to the right gluteal region, right posterolateral leg and dorsal foot. He notes intermittent tingling and numbness. No focal weakness. No bowel or bladder incontinence or saddle anesthesia. The patient notes that his pain is worst first thing in the morning and gradually improves throughout the day.  The patient states that average pain over the past week was 5/10 in severity. Mood: Patient denies anxiety or depression.  Sleep: Patient denies having difficulty with sleep initiation or maintenance 2/2 pain.   12/29/2023  AIYANA VARELA is a 83 year-old man presenting for a RPV for a history of chronic low back pain with RLE radicular features. Patient underwent a RIGHT L4-5 L5-S1 TFESI on 12/15/2023. The patient notes having 50% relief of his pain since the procedure and notes improvement of function--particularly with standing and walking. He notes significant improvement of radicular pain in the RLE. He gets occasional tingling and numbness in the right posterior thigh and leg. No focal weakness in the lower extremities. No bowel or bladder incontinence or saddle anesthesia.  The patient states that average pain over the past week was 5/10 in severity. Mood: No anxiety or depression.  Sleep: No difficulty with sleep 2/2 pain.  12/11/2023: AIYANA VARELA is a 83 year-old man presenting for a NPV for a history of chronic low back pain with RLE radicular features. The patient notes having pain over the past year which has gotten significantly worse over the past 2-3 months. No history of trauma or focal injury. At this point, the patient endorses having an aching pain in the right low lumbosacral region with radiation to the right posterolateral thigh and lateral leg to the dorsal foot. No focal numbness or weakness in the lower extremities. No bowel or bladder incontinence or saddle anesthesia. Pain is worse with standing and walking.  The patient states that average pain over the past week was 10/10 in severity. Sleep: No difficulty with sleep initiation or maintenance at this time.  [] : no [FreeTextEntry7] : b/l legs  [de-identified] : waking up in the morning [de-identified] : MRI

## 2024-08-30 NOTE — PHYSICAL EXAM
[de-identified] : Gen: NAD Head: NC/AT Eyes: no glasses, no scleral icterus ENT: mucous membranes moist CV: RRR, S1 S2, no mrg Lungs: CTAB, nonlabored breathing Abd: soft, NT/ND Ext: full ROM in all extremities, no peripheral edema Back: limited extension 2/2 pain, +SLR on the right Neuro: CN intact LEs +5 L +5 R hip flexion +5 L +5 R leg extension +5 L +5 R leg flexion +5 L +5 R foot dorsiflexion +5 L +5 R foot plantarflexion +5 L +5 R EHL extension Psych: normal affect Skin: no visible lesions

## 2024-08-30 NOTE — PHYSICAL EXAM
[de-identified] : Gen: NAD Head: NC/AT Eyes: no glasses, no scleral icterus ENT: mucous membranes moist CV: RRR, S1 S2, no mrg Lungs: CTAB, nonlabored breathing Abd: soft, NT/ND Ext: full ROM in all extremities, no peripheral edema Back: limited extension 2/2 pain, +SLR on the right Neuro: CN intact LEs +5 L +5 R hip flexion +5 L +5 R leg extension +5 L +5 R leg flexion +5 L +5 R foot dorsiflexion +5 L +5 R foot plantarflexion +5 L +5 R EHL extension Psych: normal affect Skin: no visible lesions

## 2024-08-30 NOTE — HISTORY OF PRESENT ILLNESS
[Lower back] : lower back [Right Leg] : right leg [7] : 7 [Dull/Aching] : dull/aching [Radiating] : radiating [Intermittent] : intermittent [Sleep] : sleep [Meds] : meds [Lying in bed] : lying in bed [0] : 0 [FreeTextEntry1] : 8/30/2024 AIYANA VARELA is a 83 year-old man presenting for a RPV for a history of chronic low back pain. The patient notes that he continues to have difficulty with ambulation 2/2 pain. The patient notes that he continues to have pain in the low back with radiation to the right posterior thigh and anterolateral leg. No focal numbness or weakness in the lower extremities. No bowel or bladder incontinence or saddle anesthesia. The patient continues to take acetaminophen and diclofenac prn. He notes that these do help with his pain.  The patient states that average pain over the past week was 5/10 in severity. Mood: No depression or anxiety.  Sleep: Patient notes occasional difficulty with sleep maintenance 2/2 pain.   8/2/2024 AIYANA VARELA is a 83 year-old man presenting for a RPV for a history of chronic low back pain. The patient underwent a RIGHT L4-L5, L5-S1 TFESI w/o MAC on 7/18/2024. The patient notes 50% improvement of pain and function since the procedure. The patient notes continuing to have an aching pain in the right low back with radiation to the right posterior thigh and posterolateral leg. No focal numbness or weakness in the lower extremities. No bowel or bladder incontinence or saddle anesthesia. The patient states that average pain over the past week was 4/10 in severity. Mood: No depression or anxiety.  Sleep: Patient notes occasional difficulty with sleep maintenance 2/2 pain.   5/3/2024: AIYANA VARELA is a 83 year-old man presenting for a RPV for a history of chronic low back pain. The patient notes that he continues to have some aching pain in the right low back with radiation to the right gluteal region and posterolateral thigh and posterolateral leg. No focal numbness or weakness in the lower extremities. No bowel or bladder incontinence or saddle anesthesia. Pain limits his ability to walk for more than a few minutes.  The patient states that average pain over the past week was 6/10 in severity. Mood: No depression or anxiety.  Sleep: Patient notes occasional difficulty with sleep maintenance 2/2 pain.   3/1/2024: AIYANA VARELA is a 83 year-old man presenting for a RPV for a history of chronic low back pain with RLE radicular features. Overall, the patient feels that his pain has been stable and manageable since his RIGHT L4-L5, L5-S1 TFESI on 12/15/2023. The patient notes ongoing intermittent pain in the right low back with radiation to the right gluteal region, right posterolateral leg and dorsal foot. He notes intermittent tingling and numbness. No focal weakness. No bowel or bladder incontinence or saddle anesthesia. The patient notes that his pain is worst first thing in the morning and gradually improves throughout the day.  The patient states that average pain over the past week was 5/10 in severity. Mood: Patient denies anxiety or depression.  Sleep: Patient denies having difficulty with sleep initiation or maintenance 2/2 pain.   12/29/2023  AIYANA VARELA is a 83 year-old man presenting for a RPV for a history of chronic low back pain with RLE radicular features. Patient underwent a RIGHT L4-5 L5-S1 TFESI on 12/15/2023. The patient notes having 50% relief of his pain since the procedure and notes improvement of function--particularly with standing and walking. He notes significant improvement of radicular pain in the RLE. He gets occasional tingling and numbness in the right posterior thigh and leg. No focal weakness in the lower extremities. No bowel or bladder incontinence or saddle anesthesia.  The patient states that average pain over the past week was 5/10 in severity. Mood: No anxiety or depression.  Sleep: No difficulty with sleep 2/2 pain.  12/11/2023: AIYANA VARELA is a 83 year-old man presenting for a NPV for a history of chronic low back pain with RLE radicular features. The patient notes having pain over the past year which has gotten significantly worse over the past 2-3 months. No history of trauma or focal injury. At this point, the patient endorses having an aching pain in the right low lumbosacral region with radiation to the right posterolateral thigh and lateral leg to the dorsal foot. No focal numbness or weakness in the lower extremities. No bowel or bladder incontinence or saddle anesthesia. Pain is worse with standing and walking.  The patient states that average pain over the past week was 10/10 in severity. Sleep: No difficulty with sleep initiation or maintenance at this time.  [] : no [FreeTextEntry7] : b/l legs  [de-identified] : waking up in the morning [de-identified] : MRI

## 2024-09-03 ENCOUNTER — RX RENEWAL (OUTPATIENT)
Age: 84
End: 2024-09-03

## 2024-09-04 ENCOUNTER — APPOINTMENT (OUTPATIENT)
Dept: INTERNAL MEDICINE | Facility: CLINIC | Age: 84
End: 2024-09-04
Payer: MEDICARE

## 2024-09-04 VITALS
OXYGEN SATURATION: 96 % | WEIGHT: 181 LBS | DIASTOLIC BLOOD PRESSURE: 81 MMHG | SYSTOLIC BLOOD PRESSURE: 142 MMHG | HEART RATE: 72 BPM | BODY MASS INDEX: 27.43 KG/M2 | HEIGHT: 68 IN

## 2024-09-04 VITALS — DIASTOLIC BLOOD PRESSURE: 80 MMHG | SYSTOLIC BLOOD PRESSURE: 128 MMHG

## 2024-09-04 DIAGNOSIS — R60.0 LOCALIZED EDEMA: ICD-10-CM

## 2024-09-04 DIAGNOSIS — D50.0 IRON DEFICIENCY ANEMIA SECONDARY TO BLOOD LOSS (CHRONIC): ICD-10-CM

## 2024-09-04 DIAGNOSIS — I48.0 PAROXYSMAL ATRIAL FIBRILLATION: ICD-10-CM

## 2024-09-04 DIAGNOSIS — Z95.1 PRESENCE OF AORTOCORONARY BYPASS GRAFT: ICD-10-CM

## 2024-09-04 DIAGNOSIS — I47.29 OTHER VENTRICULAR TACHYCARDIA: ICD-10-CM

## 2024-09-04 DIAGNOSIS — R79.89 OTHER SPECIFIED ABNORMAL FINDINGS OF BLOOD CHEMISTRY: ICD-10-CM

## 2024-09-04 DIAGNOSIS — I50.20 UNSPECIFIED SYSTOLIC (CONGESTIVE) HEART FAILURE: ICD-10-CM

## 2024-09-04 DIAGNOSIS — I77.89 OTHER SPECIFIED DISORDERS OF ARTERIES AND ARTERIOLES: ICD-10-CM

## 2024-09-04 DIAGNOSIS — D64.9 ANEMIA, UNSPECIFIED: ICD-10-CM

## 2024-09-04 DIAGNOSIS — I27.20 PULMONARY HYPERTENSION, UNSPECIFIED: ICD-10-CM

## 2024-09-04 DIAGNOSIS — R06.09 OTHER FORMS OF DYSPNEA: ICD-10-CM

## 2024-09-04 DIAGNOSIS — E78.5 HYPERLIPIDEMIA, UNSPECIFIED: ICD-10-CM

## 2024-09-04 PROCEDURE — 99214 OFFICE O/P EST MOD 30 MIN: CPT

## 2024-09-04 PROCEDURE — G2211 COMPLEX E/M VISIT ADD ON: CPT

## 2024-09-04 PROCEDURE — 36415 COLL VENOUS BLD VENIPUNCTURE: CPT

## 2024-09-04 NOTE — ASSESSMENT
[FreeTextEntry1] : Paroxysmal atrial fibrillation no symptoms in NSR on exam Not a good candidate for anticoagulation due to likely GI bleeding source 12/2023 echocardiogram decreased LV systolic function compared to 2022 no symptoms no known events with elevated right sided pressures since 2022  good functional status and no cardiovascular symptoms dementia neurologist Moreno Viera did not go for pharmacologic stress test Uncertain if patient is compliant with his medications

## 2024-09-04 NOTE — HISTORY OF PRESENT ILLNESS
[FreeTextEntry1] : Here to follow multiple ongoing conditions [de-identified] : notes his memory is worse now agreeable to see neurologist Does not remember he already has a neurologist overall back issues not as bad recently got injections that helped Last seen June 18, 2024 Seeing pain management no symptoms, stable functional status Seen June 13, 2024 complaining of swelling of legs Despite his cognitive decline there is no one except him managing his own medicines unsure if compliant in fact he likely misses them frequently 4/8/24 in NSR wit 1st degree av block Zio Monitor 3 days 21 hours showed 1 run of ventricular tachycardia lasting 4 beats with a maximum rate of 211, 8 supraventricular tachycardia runs the fastest lasting 12 minutes with a maximum rate of 176 and the longest lasting 12-1/2 minutes with an average rate of 143 Atrial fibrillation burden was 3%  seen December 18 with nice response to diuretic with improvement of edema Did not have any further cardiology evaluation  Seen 12/13/23 with son who is a reliable historian and we will do future contact and change of medications through his son Medication list and reasons for each medication reviewed with son in detail Significant change in echocardiogram with decreased LV function and significant increase in right-sided pressures Was found to have edema on exam and furosemide was begun BNP was elevated over 6000 Mild renal insufficiency Abnormal immunoelectrophoresis Very limited in quality of life and activities by lower back pain issues  epidural on December 15, 2023 He is off his Plavix as of last week and held his aspirin as of December 11 has epidural 12/15/23 no issues, maybe a little better   Change in bowel habits reason for doing colonoscopy, Colonoscopy was postponed by me no chest pain no chest pressure no shortness of breath He is not a reliable historian due to his mild dementia plays golf 18 holes walks the course no issues, not in last few weeks due to cold weather memory issues, cognitive decline, sees neurologist Creatinine also a bit elevated on last test that has some anemia s/p cholecystectomy in 2017 with gangrenous gallbladder s/p coronary artery bypass grafting x 5 in 2002 Thallium stress test 1/2017 showed no significant ischemia but an ejection fraction of 44% echocardiography 1/2019 showed small inferobasal hypokinesis with an estimated ejection fraction in the 55% - 60 % range and moderate aortic insufficiency echo 6/2021 EF 50-55%, inferoseptal and basal inferior hypokinesis, 2 + AI, mild AS echo 8/2022 LVEF 55-60% moderate AI ? mild AS hyperlipidemia on both a statin and fibrate with no muscle side effects past history of prostate cancer and sees his urologist regularly, recent rectal ok, declines rectal today

## 2024-09-04 NOTE — PHYSICAL EXAM
[No Acute Distress] : no acute distress [Well Nourished] : well nourished [Well Developed] : well developed [Normal Sclera/Conjunctiva] : normal sclera/conjunctiva [Normal Outer Ear/Nose] : the outer ears and nose were normal in appearance [Normal Oropharynx] : the oropharynx was normal [No JVD] : no jugular venous distention [No Lymphadenopathy] : no lymphadenopathy [Supple] : supple [Thyroid Normal, No Nodules] : the thyroid was normal and there were no nodules present [No Respiratory Distress] : no respiratory distress  [No Accessory Muscle Use] : no accessory muscle use [Clear to Auscultation] : lungs were clear to auscultation bilaterally [Normal Rate] : normal rate  [Normal S1, S2] : normal S1 and S2 [Distant] : the heart sounds were distant [I] : a grade 1 [No Carotid Bruits] : no carotid bruits [No Abdominal Bruit] : a ~M bruit was not heard ~T in the abdomen [No Varicosities] : no varicosities [Pedal Pulses Present] : the pedal pulses are present [No Palpable Aorta] : no palpable aorta [No Extremity Clubbing/Cyanosis] : no extremity clubbing/cyanosis [Soft] : abdomen soft [Non Tender] : non-tender [Non-distended] : non-distended [No Masses] : no abdominal mass palpated [No HSM] : no HSM [Normal Bowel Sounds] : normal bowel sounds [Normal Posterior Cervical Nodes] : no posterior cervical lymphadenopathy [Normal Anterior Cervical Nodes] : no anterior cervical lymphadenopathy [No CVA Tenderness] : no CVA  tenderness [No Spinal Tenderness] : no spinal tenderness [No Joint Swelling] : no joint swelling [Grossly Normal Strength/Tone] : grossly normal strength/tone [No Rash] : no rash [Coordination Grossly Intact] : coordination grossly intact [No Focal Deficits] : no focal deficits [Speech Grossly Normal] : speech grossly normal [Regular Rhythm] : with a regular rhythm [de-identified] :  edema same 1 to 2 +

## 2024-09-04 NOTE — HISTORY OF PRESENT ILLNESS
[FreeTextEntry1] : Here to follow multiple ongoing conditions [de-identified] : notes his memory is worse now agreeable to see neurologist Does not remember he already has a neurologist overall back issues not as bad recently got injections that helped Last seen June 18, 2024 Seeing pain management no symptoms, stable functional status Seen June 13, 2024 complaining of swelling of legs Despite his cognitive decline there is no one except him managing his own medicines unsure if compliant in fact he likely misses them frequently 4/8/24 in NSR wit 1st degree av block Zio Monitor 3 days 21 hours showed 1 run of ventricular tachycardia lasting 4 beats with a maximum rate of 211, 8 supraventricular tachycardia runs the fastest lasting 12 minutes with a maximum rate of 176 and the longest lasting 12-1/2 minutes with an average rate of 143 Atrial fibrillation burden was 3%  seen December 18 with nice response to diuretic with improvement of edema Did not have any further cardiology evaluation  Seen 12/13/23 with son who is a reliable historian and we will do future contact and change of medications through his son Medication list and reasons for each medication reviewed with son in detail Significant change in echocardiogram with decreased LV function and significant increase in right-sided pressures Was found to have edema on exam and furosemide was begun BNP was elevated over 6000 Mild renal insufficiency Abnormal immunoelectrophoresis Very limited in quality of life and activities by lower back pain issues  epidural on December 15, 2023 He is off his Plavix as of last week and held his aspirin as of December 11 has epidural 12/15/23 no issues, maybe a little better   Change in bowel habits reason for doing colonoscopy, Colonoscopy was postponed by me no chest pain no chest pressure no shortness of breath He is not a reliable historian due to his mild dementia plays golf 18 holes walks the course no issues, not in last few weeks due to cold weather memory issues, cognitive decline, sees neurologist Creatinine also a bit elevated on last test that has some anemia s/p cholecystectomy in 2017 with gangrenous gallbladder s/p coronary artery bypass grafting x 5 in 2002 Thallium stress test 1/2017 showed no significant ischemia but an ejection fraction of 44% echocardiography 1/2019 showed small inferobasal hypokinesis with an estimated ejection fraction in the 55% - 60 % range and moderate aortic insufficiency echo 6/2021 EF 50-55%, inferoseptal and basal inferior hypokinesis, 2 + AI, mild AS echo 8/2022 LVEF 55-60% moderate AI ? mild AS hyperlipidemia on both a statin and fibrate with no muscle side effects past history of prostate cancer and sees his urologist regularly, recent rectal ok, declines rectal today

## 2024-09-04 NOTE — PHYSICAL EXAM
[No Acute Distress] : no acute distress [Well Nourished] : well nourished [Well Developed] : well developed [Normal Sclera/Conjunctiva] : normal sclera/conjunctiva [Normal Outer Ear/Nose] : the outer ears and nose were normal in appearance [Normal Oropharynx] : the oropharynx was normal [No JVD] : no jugular venous distention [No Lymphadenopathy] : no lymphadenopathy [Supple] : supple [Thyroid Normal, No Nodules] : the thyroid was normal and there were no nodules present [No Respiratory Distress] : no respiratory distress  [No Accessory Muscle Use] : no accessory muscle use [Clear to Auscultation] : lungs were clear to auscultation bilaterally [Normal Rate] : normal rate  [Normal S1, S2] : normal S1 and S2 [Distant] : the heart sounds were distant [I] : a grade 1 [No Carotid Bruits] : no carotid bruits [No Abdominal Bruit] : a ~M bruit was not heard ~T in the abdomen [No Varicosities] : no varicosities [Pedal Pulses Present] : the pedal pulses are present [No Palpable Aorta] : no palpable aorta [No Extremity Clubbing/Cyanosis] : no extremity clubbing/cyanosis [Soft] : abdomen soft [Non Tender] : non-tender [Non-distended] : non-distended [No Masses] : no abdominal mass palpated [No HSM] : no HSM [Normal Bowel Sounds] : normal bowel sounds [Normal Posterior Cervical Nodes] : no posterior cervical lymphadenopathy [Normal Anterior Cervical Nodes] : no anterior cervical lymphadenopathy [No CVA Tenderness] : no CVA  tenderness [No Spinal Tenderness] : no spinal tenderness [No Joint Swelling] : no joint swelling [Grossly Normal Strength/Tone] : grossly normal strength/tone [No Rash] : no rash [Coordination Grossly Intact] : coordination grossly intact [No Focal Deficits] : no focal deficits [Speech Grossly Normal] : speech grossly normal [Regular Rhythm] : with a regular rhythm [de-identified] :  edema same 1 to 2 +

## 2024-09-05 LAB
ALBUMIN SERPL ELPH-MCNC: 4.1 G/DL
ALP BLD-CCNC: 140 U/L
ALT SERPL-CCNC: 40 U/L
ANION GAP SERPL CALC-SCNC: 13 MMOL/L
AST SERPL-CCNC: 30 U/L
BASOPHILS # BLD AUTO: 0.04 K/UL
BASOPHILS NFR BLD AUTO: 0.5 %
BILIRUB SERPL-MCNC: 0.4 MG/DL
BUN SERPL-MCNC: 31 MG/DL
CALCIUM SERPL-MCNC: 9.2 MG/DL
CHLORIDE SERPL-SCNC: 110 MMOL/L
CO2 SERPL-SCNC: 25 MMOL/L
CREAT SERPL-MCNC: 1.38 MG/DL
EGFR: 51 ML/MIN/1.73M2
EOSINOPHIL # BLD AUTO: 0.41 K/UL
EOSINOPHIL NFR BLD AUTO: 5.5 %
FERRITIN SERPL-MCNC: 290 NG/ML
GLUCOSE SERPL-MCNC: 128 MG/DL
HCT VFR BLD CALC: 35.8 %
HGB BLD-MCNC: 10.7 G/DL
IMM GRANULOCYTES NFR BLD AUTO: 2 %
IRON SATN MFR SERPL: 36 %
IRON SERPL-MCNC: 103 UG/DL
LYMPHOCYTES # BLD AUTO: 1.78 K/UL
LYMPHOCYTES NFR BLD AUTO: 24 %
MAN DIFF?: NORMAL
MCHC RBC-ENTMCNC: 29.8 PG
MCHC RBC-ENTMCNC: 29.9 GM/DL
MCV RBC AUTO: 99.7 FL
MONOCYTES # BLD AUTO: 0.64 K/UL
MONOCYTES NFR BLD AUTO: 8.6 %
NEUTROPHILS # BLD AUTO: 4.4 K/UL
NEUTROPHILS NFR BLD AUTO: 59.4 %
NT-PROBNP SERPL-MCNC: 4338 PG/ML
PLATELET # BLD AUTO: 194 K/UL
POTASSIUM SERPL-SCNC: 4.6 MMOL/L
PROT SERPL-MCNC: 6.1 G/DL
RBC # BLD: 3.59 M/UL
RBC # FLD: 15 %
SODIUM SERPL-SCNC: 148 MMOL/L
TIBC SERPL-MCNC: 284 UG/DL
UIBC SERPL-MCNC: 180 UG/DL
WBC # FLD AUTO: 7.42 K/UL

## 2024-09-19 ENCOUNTER — APPOINTMENT (OUTPATIENT)
Dept: PAIN MANAGEMENT | Facility: CLINIC | Age: 84
End: 2024-09-19

## 2024-09-19 DIAGNOSIS — M48.061 SPINAL STENOSIS, LUMBAR REGION WITHOUT NEUROGENIC CLAUDICATION: ICD-10-CM

## 2024-09-19 DIAGNOSIS — M51.26 OTHER INTERVERTEBRAL DISC DISPLACEMENT, LUMBAR REGION: ICD-10-CM

## 2024-09-19 PROCEDURE — 62323 NJX INTERLAMINAR LMBR/SAC: CPT

## 2024-09-19 NOTE — PROCEDURE
[FreeTextEntry1] : L5-S1 lumbar interlaminar epidural steroid injection [FreeTextEntry2] : chronic lumbar radiculopathy [FreeTextEntry3] : Procedure Date: 09/19/2024   Preoperative Diagnosis: chronic low back pain with bilateral sciatica   Procedure: L5-S1 epidural steroid injection under fluoroscopic guidance   Anesthesia: local   Complications: none   EBL: none   Procedure in detail: Patient was seen and examined. Risks, benefits, and alternatives for the procedure were discussed with the patient in detail. The patient expressed understanding, gave written and verbal consent, and placed themselves in a prone position on the procedure table. Skin overlying the lumbosacral spine was prepped with chloraprep and draped in the usual sterile fashion. Fluoroscopic images were obtained to identify the L5 and S1 vertebral body. Target was the interlaminar space between the L5 and S1 vertebral body. Skin overlying the target was marked and infiltrated with 1% lidocaine. Using an 20 gauge, 9cm Tuohy needle, this was inserted and advanced under intermittent fluoroscopic guidance. When felt to be engaged in the epidural space, lateral view was used to confirm depth. Needle was advanced until loss of resistance to saline was achieved. After negative aspiration for heme/csf, contrast was injected under live fluoroscopy, which showed contrast spread consistent with epidural flow. No evidence of intravascular or intrathecal uptake. At this point, a total of 3ml of injectate, which consisted of 1ml of 80mg/ml depomedrol and 2ml of normal saline were injected. The needle was restyletted and withdrawn, a band aid was placed over the injection site. Patient tolerated the procedure well. The patient recovered uneventfully and was discharged home in stable condition.

## 2024-10-11 ENCOUNTER — APPOINTMENT (OUTPATIENT)
Dept: PAIN MANAGEMENT | Facility: CLINIC | Age: 84
End: 2024-10-11

## 2025-02-26 ENCOUNTER — APPOINTMENT (OUTPATIENT)
Dept: INTERNAL MEDICINE | Facility: CLINIC | Age: 85
End: 2025-02-26
Payer: MEDICARE

## 2025-02-26 ENCOUNTER — NON-APPOINTMENT (OUTPATIENT)
Age: 85
End: 2025-02-26

## 2025-02-26 VITALS — DIASTOLIC BLOOD PRESSURE: 70 MMHG | SYSTOLIC BLOOD PRESSURE: 125 MMHG

## 2025-02-26 VITALS
DIASTOLIC BLOOD PRESSURE: 69 MMHG | HEIGHT: 68 IN | SYSTOLIC BLOOD PRESSURE: 148 MMHG | WEIGHT: 173 LBS | OXYGEN SATURATION: 97 % | BODY MASS INDEX: 26.22 KG/M2 | HEART RATE: 71 BPM

## 2025-02-26 DIAGNOSIS — I25.10 ATHEROSCLEROTIC HEART DISEASE OF NATIVE CORONARY ARTERY W/OUT ANGINA PECTORIS: ICD-10-CM

## 2025-02-26 DIAGNOSIS — R21 RASH AND OTHER NONSPECIFIC SKIN ERUPTION: ICD-10-CM

## 2025-02-26 DIAGNOSIS — I27.20 PULMONARY HYPERTENSION, UNSPECIFIED: ICD-10-CM

## 2025-02-26 DIAGNOSIS — R60.0 LOCALIZED EDEMA: ICD-10-CM

## 2025-02-26 DIAGNOSIS — E78.5 HYPERLIPIDEMIA, UNSPECIFIED: ICD-10-CM

## 2025-02-26 DIAGNOSIS — D51.0 VITAMIN B12 DEFICIENCY ANEMIA DUE TO INTRINSIC FACTOR DEFICIENCY: ICD-10-CM

## 2025-02-26 DIAGNOSIS — M51.26 OTHER INTERVERTEBRAL DISC DISPLACEMENT, LUMBAR REGION: ICD-10-CM

## 2025-02-26 DIAGNOSIS — Z95.1 PRESENCE OF AORTOCORONARY BYPASS GRAFT: ICD-10-CM

## 2025-02-26 DIAGNOSIS — I77.89 OTHER SPECIFIED DISORDERS OF ARTERIES AND ARTERIOLES: ICD-10-CM

## 2025-02-26 DIAGNOSIS — I50.20 UNSPECIFIED SYSTOLIC (CONGESTIVE) HEART FAILURE: ICD-10-CM

## 2025-02-26 DIAGNOSIS — I47.29 OTHER VENTRICULAR TACHYCARDIA: ICD-10-CM

## 2025-02-26 DIAGNOSIS — D50.0 IRON DEFICIENCY ANEMIA SECONDARY TO BLOOD LOSS (CHRONIC): ICD-10-CM

## 2025-02-26 DIAGNOSIS — I48.0 PAROXYSMAL ATRIAL FIBRILLATION: ICD-10-CM

## 2025-02-26 PROCEDURE — G2211 COMPLEX E/M VISIT ADD ON: CPT

## 2025-02-26 PROCEDURE — 99214 OFFICE O/P EST MOD 30 MIN: CPT

## 2025-02-26 PROCEDURE — 93000 ELECTROCARDIOGRAM COMPLETE: CPT

## 2025-02-26 PROCEDURE — 36415 COLL VENOUS BLD VENIPUNCTURE: CPT

## 2025-02-26 RX ORDER — TRIAMCINOLONE ACETONIDE 1 MG/G
0.1 CREAM TOPICAL
Qty: 80 | Refills: 1 | Status: ACTIVE | COMMUNITY
Start: 2025-02-26 | End: 1900-01-01

## 2025-02-27 ENCOUNTER — NON-APPOINTMENT (OUTPATIENT)
Age: 85
End: 2025-02-27

## 2025-02-27 LAB
25(OH)D3 SERPL-MCNC: 31.9 NG/ML
ALBUMIN SERPL ELPH-MCNC: 4.1 G/DL
ALP BLD-CCNC: 138 U/L
ALT SERPL-CCNC: 24 U/L
ANION GAP SERPL CALC-SCNC: 10 MMOL/L
AST SERPL-CCNC: 19 U/L
BASOPHILS # BLD AUTO: 0.07 K/UL
BASOPHILS NFR BLD AUTO: 0.8 %
BILIRUB SERPL-MCNC: 0.7 MG/DL
BUN SERPL-MCNC: 26 MG/DL
CALCIUM SERPL-MCNC: 9 MG/DL
CHLORIDE SERPL-SCNC: 109 MMOL/L
CHOLEST SERPL-MCNC: 122 MG/DL
CK SERPL-CCNC: 46 U/L
CO2 SERPL-SCNC: 28 MMOL/L
CREAT SERPL-MCNC: 1.31 MG/DL
EGFR: 54 ML/MIN/1.73M2
EOSINOPHIL # BLD AUTO: 0.31 K/UL
EOSINOPHIL NFR BLD AUTO: 3.7 %
ESTIMATED AVERAGE GLUCOSE: 108 MG/DL
FERRITIN SERPL-MCNC: 283 NG/ML
GLUCOSE SERPL-MCNC: 104 MG/DL
HBA1C MFR BLD HPLC: 5.4 %
HCT VFR BLD CALC: 36 %
HDLC SERPL-MCNC: 36 MG/DL
HGB BLD-MCNC: 10.8 G/DL
IMM GRANULOCYTES NFR BLD AUTO: 0.5 %
IRON SATN MFR SERPL: 43 %
IRON SERPL-MCNC: 123 UG/DL
LDLC SERPL CALC-MCNC: 54 MG/DL
LYMPHOCYTES # BLD AUTO: 1.79 K/UL
LYMPHOCYTES NFR BLD AUTO: 21.3 %
MAN DIFF?: NORMAL
MCHC RBC-ENTMCNC: 29.6 PG
MCHC RBC-ENTMCNC: 30 G/DL
MCV RBC AUTO: 98.6 FL
MONOCYTES # BLD AUTO: 0.56 K/UL
MONOCYTES NFR BLD AUTO: 6.7 %
NEUTROPHILS # BLD AUTO: 5.65 K/UL
NEUTROPHILS NFR BLD AUTO: 67 %
NONHDLC SERPL-MCNC: 86 MG/DL
NT-PROBNP SERPL-MCNC: 5052 PG/ML
PLATELET # BLD AUTO: 249 K/UL
POTASSIUM SERPL-SCNC: 5.3 MMOL/L
PROT SERPL-MCNC: 6.2 G/DL
PSA SERPL-MCNC: 1.72 NG/ML
RBC # BLD: 3.65 M/UL
RBC # FLD: 13.5 %
SODIUM SERPL-SCNC: 147 MMOL/L
T4 FREE SERPL-MCNC: 1.2 NG/DL
TIBC SERPL-MCNC: 285 UG/DL
TRIGL SERPL-MCNC: 195 MG/DL
TSH SERPL-ACNC: 1.31 UIU/ML
UIBC SERPL-MCNC: 162 UG/DL
VIT B12 SERPL-MCNC: 779 PG/ML
WBC # FLD AUTO: 8.42 K/UL

## 2025-03-26 ENCOUNTER — RX RENEWAL (OUTPATIENT)
Age: 85
End: 2025-03-26

## 2025-04-15 ENCOUNTER — APPOINTMENT (OUTPATIENT)
Dept: NUCLEAR MEDICINE | Facility: CLINIC | Age: 85
End: 2025-04-15
Payer: MEDICARE

## 2025-04-15 PROCEDURE — 78814 PET IMAGE W/CT LMTD: CPT

## 2025-07-02 ENCOUNTER — NON-APPOINTMENT (OUTPATIENT)
Age: 85
End: 2025-07-02

## 2025-07-02 ENCOUNTER — APPOINTMENT (OUTPATIENT)
Dept: INTERNAL MEDICINE | Facility: CLINIC | Age: 85
End: 2025-07-02
Payer: MEDICARE

## 2025-07-02 VITALS — BODY MASS INDEX: 25.76 KG/M2 | HEART RATE: 72 BPM | OXYGEN SATURATION: 98 % | HEIGHT: 68 IN | WEIGHT: 170 LBS

## 2025-07-02 VITALS — SYSTOLIC BLOOD PRESSURE: 120 MMHG | DIASTOLIC BLOOD PRESSURE: 74 MMHG

## 2025-07-02 PROBLEM — E07.9 THYROID DISORDER: Status: ACTIVE | Noted: 2025-07-02

## 2025-07-02 PROBLEM — R73.03 PREDIABETES: Status: ACTIVE | Noted: 2025-07-02

## 2025-07-02 LAB
BILIRUB UR QL STRIP: NORMAL
CLARITY UR: CLEAR
COLLECTION METHOD: NORMAL
GLUCOSE UR-MCNC: NORMAL
HCG UR QL: 0.2 EU/DL
HGB UR QL STRIP.AUTO: NORMAL
KETONES UR-MCNC: NORMAL
LEUKOCYTE ESTERASE UR QL STRIP: NORMAL
NITRITE UR QL STRIP: NORMAL
PH UR STRIP: 5.5
PROT UR STRIP-MCNC: NORMAL
SP GR UR STRIP: 1.02

## 2025-07-02 PROCEDURE — 93000 ELECTROCARDIOGRAM COMPLETE: CPT

## 2025-07-02 PROCEDURE — 36415 COLL VENOUS BLD VENIPUNCTURE: CPT

## 2025-07-02 PROCEDURE — 81003 URINALYSIS AUTO W/O SCOPE: CPT | Mod: QW

## 2025-07-02 PROCEDURE — G0439: CPT

## 2025-07-06 LAB
25(OH)D3 SERPL-MCNC: 37.7 NG/ML
ALBUMIN SERPL ELPH-MCNC: 4.1 G/DL
ALP BLD-CCNC: 104 U/L
ALT SERPL-CCNC: 26 U/L
ANION GAP SERPL CALC-SCNC: 14 MMOL/L
AST SERPL-CCNC: 23 U/L
BASOPHILS # BLD AUTO: 0.07 K/UL
BASOPHILS NFR BLD AUTO: 0.9 %
BILIRUB SERPL-MCNC: 0.6 MG/DL
BUN SERPL-MCNC: 33 MG/DL
CALCIUM SERPL-MCNC: 9.1 MG/DL
CHLORIDE SERPL-SCNC: 107 MMOL/L
CHOLEST SERPL-MCNC: 140 MG/DL
CK SERPL-CCNC: 33 U/L
CO2 SERPL-SCNC: 24 MMOL/L
CREAT SERPL-MCNC: 1.58 MG/DL
EGFRCR SERPLBLD CKD-EPI 2021: 43 ML/MIN/1.73M2
EOSINOPHIL # BLD AUTO: 0.36 K/UL
EOSINOPHIL NFR BLD AUTO: 4.8 %
ESTIMATED AVERAGE GLUCOSE: 100 MG/DL
GLUCOSE SERPL-MCNC: 160 MG/DL
HBA1C MFR BLD HPLC: 5.1 %
HCT VFR BLD CALC: 35.8 %
HDLC SERPL-MCNC: 35 MG/DL
HGB BLD-MCNC: 10.9 G/DL
IMM GRANULOCYTES NFR BLD AUTO: 0.8 %
LDLC SERPL-MCNC: 67 MG/DL
LYMPHOCYTES # BLD AUTO: 1.46 K/UL
LYMPHOCYTES NFR BLD AUTO: 19.4 %
MAN DIFF?: NORMAL
MCHC RBC-ENTMCNC: 29.5 PG
MCHC RBC-ENTMCNC: 30.4 G/DL
MCV RBC AUTO: 97 FL
MONOCYTES # BLD AUTO: 0.49 K/UL
MONOCYTES NFR BLD AUTO: 6.5 %
NEUTROPHILS # BLD AUTO: 5.1 K/UL
NEUTROPHILS NFR BLD AUTO: 67.6 %
NONHDLC SERPL-MCNC: 106 MG/DL
NT-PROBNP SERPL-MCNC: 1792 PG/ML
PLATELET # BLD AUTO: 230 K/UL
POTASSIUM SERPL-SCNC: 5.1 MMOL/L
PROT SERPL-MCNC: 6.5 G/DL
PSA SERPL-MCNC: 1.85 NG/ML
RBC # BLD: 3.69 M/UL
RBC # FLD: 14.2 %
SODIUM SERPL-SCNC: 144 MMOL/L
TRIGL SERPL-MCNC: 237 MG/DL
TSH SERPL-ACNC: 1.3 UIU/ML
VIT B12 SERPL-MCNC: >2000 PG/ML
WBC # FLD AUTO: 7.54 K/UL

## 2025-07-22 ENCOUNTER — APPOINTMENT (OUTPATIENT)
Dept: NEUROLOGY | Facility: CLINIC | Age: 85
End: 2025-07-22